# Patient Record
Sex: MALE | Race: WHITE | Employment: STUDENT
[De-identification: names, ages, dates, MRNs, and addresses within clinical notes are randomized per-mention and may not be internally consistent; named-entity substitution may affect disease eponyms.]

---
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
## 2020-01-02 ENCOUNTER — HOSPITAL ENCOUNTER (OUTPATIENT)
Dept: PHYSICAL THERAPY | Facility: CLINIC | Age: 20
Setting detail: THERAPIES SERIES
Discharge: HOME | End: 2020-01-02
Payer: COMMERCIAL

## 2020-01-02 PROCEDURE — 97110 THERAPEUTIC EXERCISES: CPT

## 2020-01-02 PROCEDURE — 97140 MANUAL THERAPY 1/> REGIONS: CPT

## 2020-01-02 NOTE — FLOWSHEET NOTE
[x] [facility] and Therapy    [address], [place]    Phone: [phone]    Fax:  [phone]       Physical Therapy Daily Treatment Note    Date:  2020  Patient Name:  Pedrito Kirkland    :  2000  MRN: [id number]  Physician: [address] [place] [address] [address]: [employer]- [employer]  Medical Diagnosis: M54.5- LBP                   Rehab Codes: M54.5  Onset Date: 2019                      Next 's appt. : 1/10/2020  Visit# / total visits:2/10  Cancels/No Shows: 0/0    Subjective:    Pain:  [x] Yes  [] No Location:  Pain Rating: (0-10 scale) 2-3/10 now; worst 4/10   Pain altered Tx:  [x] No  [] Yes  Action:  Comments:no known changes since IE. Objective:  Modalities: none   Precautions:   Exercises: pt returns to school 2020  Exercise Reps/ Time Weight/ Level Comments   Squats on [employer] machine 2x10 110 lbs Deep squats                                                                     Manual:  1.  DI to L glut med in SL and prone, piriformis in SL  2. Pubic correction     Other:    Specific Instructions for next treatment:    Treatment Charges: Mins Units   []  Phys perf test     [x]  Ther Exercise 15 1   [x]  Manual Therapy 30 2   []  Ther Activities     []  NMR     []  Vasocompression     []  Other     Total Treatment time 45 3       Assessment: [x] Progressing toward goals. FABERs and sheer test were initially +. However, after DI to L glut med and piriformis and pubic correction, he no longer have a + test.  He identified \"ass to the grass\" deep squats as a comparable sign. However, he squatted 2x10 with 110 lbs and had no pain. [] No change. [] Other:    Goals:  STG: (to be met in 6 treatments)  1. ? Pain: Patient will report < 3/10 pain at rest and < 5/10 pain with active movement in order to improve QOL. 2. ? ROM: Will deny symptoms with L lumbar rotation, SB AROM in order to improve functional mobility.

## 2020-01-06 ENCOUNTER — HOSPITAL ENCOUNTER (OUTPATIENT)
Dept: PHYSICAL THERAPY | Facility: CLINIC | Age: 20
Setting detail: THERAPIES SERIES
Discharge: HOME | End: 2020-01-06
Payer: COMMERCIAL

## 2020-01-06 PROCEDURE — 97140 MANUAL THERAPY 1/> REGIONS: CPT

## 2020-01-06 PROCEDURE — 97110 THERAPEUTIC EXERCISES: CPT

## 2020-01-06 NOTE — FLOWSHEET NOTE
[x] [facility] Therapy    [address] [place], [place]    Phone: [phone]    Fax:  [phone]       Physical Therapy Daily Treatment Note    Date:  2020  Patient Name:  Rosalba Paniagua  :  2000  MRN: [id number]  Physician: [address] [facility] [address]: [employer]  Medical Diagnosis: M54.5- LBP     Rehab Codes: M54.5  Onset Date: 2019                      Next 's appt. : 1/10/2020  Visit# / total visits: 3/10  Cancels/No Shows: 0/0    Subjective:    Pain:  [x] Yes  [] No Location:  Pain Rating: (0-10 scale) 2-3/10 now; worst 4/10   Pain altered Tx:  [x] No  [] Yes  Action:  Comments:did squats and dead lifts with 135# 3x10 each before he attended PT today     Objective:  Modalities: none   Precautions:   Exercises: pt returns to school 2020  Exercise Reps/ Time Weight/ Level Comments   Squats on [employer] machine 2x10 110 lbs Deep squats   Quadruped ar/leg  15  W/ dowel sabine on back   Quadruped arm/leg raise 15  On bench   CC post sling ex 20 20 lbs                                                          Manual:  1.  DI to L glut med in SL and prone, piriformis in SL  2. Pubic correction elicited an audible significant cavitation    Other:    Specific Instructions for next treatment:    Treatment Charges: Mins Units   []  Phys perf test     [x]  Ther Exercise 15 1   [x]  Manual Therapy 10 1   []  Ther Activities     []  NMR     []  Vasocompression     []  Other     Total Treatment time 25 2       Assessment: [x] Progressing toward goals. Functionally, he is progressing as he was able to dead lift and squat 3x10 with up to 135 lbs. Sheer and FABERs tests were neg. Core stability is deficient but this was most likely deficient before. Leg length was = after pubic correction       [] No change.      [] Other:    Goals:  STG: (to be met in 6 treatments)  1. ? Pain: Patient will report < 3/10 pain at rest and < 5/10 pain with active movement in order to improve QOL. 2. ? ROM: Will deny symptoms with L lumbar rotation, SB AROM in order to improve functional mobility. 3. ? Strength: Will demo appropriate T.A., ES activation in challenging, sport-specific positions- as well as consistent PPT- in order to prevent future injury. 4. ? Function: Will report < 3/10 P with 30 min sitting, standing in order to improve driving, work tolerance. 5. Independent with Home Exercise Programs  6. Will perform dead-lifts, squats against light resistance with < 3/10 pain and proper body mechanics. 7. Improve modified oswestry to 5%     Patient goals: \"deadlift and squat without pain. \"   Pt. Education:  [x] Yes  [] No  [] Reviewed Prior HEP/Ed  Method of Education: [x] Verbal resume weight lifting as able   [] Demo  [] Written  Comprehension of Education:  [x] Verbalizes understanding. [] Demonstrates understanding. [] Needs review. [] Demonstrates/verbalizes HEP/Ed previously given. Plan: [x] Continue per plan of care. See in 2 days. Cleared to increase weight from 135 to 150-5 lbs.      [] Other:      Time In:1108 Time Out: 1140    Electronically signed by:  Ra Tipton, PT

## 2020-01-08 ENCOUNTER — HOSPITAL ENCOUNTER (OUTPATIENT)
Dept: PHYSICAL THERAPY | Facility: CLINIC | Age: 20
Setting detail: THERAPIES SERIES
Discharge: HOME | End: 2020-01-08
Payer: COMMERCIAL

## 2020-01-08 PROCEDURE — 97140 MANUAL THERAPY 1/> REGIONS: CPT

## 2020-01-08 NOTE — DISCHARGE SUMMARY
[] No change. [] Other:    Goals:  STG: (to be met in 6 treatments)  1. ? Pain: Patient will report < 3/10 pain at rest and < 5/10 pain with active movement in order to improve QOL. 2. ? ROM: Will deny symptoms with L lumbar rotation, SB AROM in order to improve functional mobility. 3. ? Strength: Will demo appropriate T.A., ES activation in challenging, sport-specific positions- as well as consistent PPT- in order to prevent future injury. 4. ? Function: Will report < 3/10 P with 30 min sitting, standing in order to improve driving, work tolerance. 5. Independent with Home Exercise Programs  6. Will perform dead-lifts, squats against light resistance with < 3/10 pain and proper body mechanics. 7. Improve modified oswestry to 5%     Patient goals: \"deadlift and squat without pain. \"   Pt. Education:  [x] Yes  [] No  [] Reviewed Prior HEP/Ed  Method of Education: [x] Verbal resume weight lifting as able   [] Demo  [] Written  Comprehension of Education:  [x] Verbalizes understanding. [] Demonstrates understanding. [] Needs review. [] Demonstrates/verbalizes HEP/Ed previously given. Plan: [] Continue per plan of care. [x] Other: As long as his symptoms continue to improve, he can continue to progress return to lifting. However, if he doesn't improve further, he can follow up with [facility] health center at [facility].         Time In:1108 Time Out: 1140    Electronically signed by:  Maryuri Gongora, PT

## 2020-01-09 ENCOUNTER — HOSPITAL ENCOUNTER (OUTPATIENT)
Dept: PHYSICAL THERAPY | Facility: CLINIC | Age: 20
Setting detail: THERAPIES SERIES
End: 2020-01-09
Payer: COMMERCIAL

## 2020-03-09 ENCOUNTER — HOSPITAL ENCOUNTER (OUTPATIENT)
Dept: PHYSICAL THERAPY | Facility: CLINIC | Age: 20
Setting detail: THERAPIES SERIES
Discharge: HOME | End: 2020-03-09
Payer: COMMERCIAL

## 2020-03-09 PROCEDURE — 97140 MANUAL THERAPY 1/> REGIONS: CPT

## 2020-03-09 NOTE — FLOWSHEET NOTE
med, neg on hip flexor. Tenderness subsided after manual.  revieweed need not to assume anterior pelvic tilt during ex and that he may be continuing to aggravate either by excessive effort or by anterior pelvic tilt. [] No change. [] Other:    Goals:  STG: (to be met in 6 treatments)  1. ? Pain: Patient will report < 3/10 pain at rest and < 5/10 pain with active movement in order to improve QOL. 2. ? ROM: Will deny symptoms with L lumbar rotation, SB AROM in order to improve functional mobility. 3. ? Strength: Will demo appropriate T.A., ES activation in challenging, sport-specific positions- as well as consistent PPT- in order to prevent future injury. 4. ? Function: Will report < 3/10 P with 30 min sitting, standing in order to improve driving, work tolerance. 5. Independent with Home Exercise Programs  6. Will perform dead-lifts, squats against light resistance with < 3/10 pain and proper body mechanics. 7. Improve modified oswestry to 5%     Patient goals: \"deadlift and squat without pain. \"   Pt. Education:  [x] Yes  [] No  [] Reviewed Prior HEP/Ed  Method of Education: [x] Verbal resume weight lifting as able   [] Demo  [] Written  Comprehension of Education:  [x] Verbalizes understanding. [] Demonstrates understanding. [] Needs review. [] Demonstrates/verbalizes HEP/Ed previously given. Plan: [x] Continue per plan of care.    [] Other:       Time VY:1130 Time Out: 5268    Electronically signed by:  Steffanie Sanchez, PT

## 2020-03-11 ENCOUNTER — HOSPITAL ENCOUNTER (OUTPATIENT)
Dept: PHYSICAL THERAPY | Facility: CLINIC | Age: 20
Setting detail: THERAPIES SERIES
Discharge: HOME | End: 2020-03-11
Payer: COMMERCIAL

## 2020-03-11 PROCEDURE — 20561 NDL INSJ W/O NJX 3+ MUSC: CPT

## 2020-03-11 PROCEDURE — 97140 MANUAL THERAPY 1/> REGIONS: CPT

## 2020-03-11 PROCEDURE — 97110 THERAPEUTIC EXERCISES: CPT

## 2020-03-11 NOTE — FLOWSHEET NOTE
[x] [facility]    [address]    Phone: [phone]    Fax:  [phone]       Physical Therapy Daily Treatment    Date:  3/11/2020  Patient Name:  Taj Lopez  :  2000  MRN: [id number]  Physician: Dr. Siddiqi People: MMO- unlimited  Medical Diagnosis:  LBP      Rehab Codes: M54.5  Onset Date: 2019                      Next 's appt.: none  Visit# / total visits: 6/10  Cancels/No Shows: 0/0    Subjective:    Pain:  [x] Yes  [] No Location:  Pain Rating: (0-10 scale) 2-3/10 now; worst 7-8/10   Pain altered Tx:  [x] No  [] Yes  Action:  Comments: Notes low back pain has waxed and waned and he has tried to get back into lifting but has been starting with light weights and progressing around 10# per week, per prior therapist recommendations. Objective:  Modalities: none   Precautions:   Exercises: pt returns to school 2020  Exercise Reps/ Time Weight/ Level Comments   Squats on Ad Dynamo machine 2x10 110 lbs Deep squats   Quadruped ar/leg  15  W/ dowel sabine on back   Quadruped arm/leg raise 15  On bench   CC post sling ex 20 20 lbs          Rocking Piri S 3x30''           Prone hip ext 20x ea     Prone hip ext- glute max 20x ea     Prone swimmers 20x ea     Prone supermans 20x           PPT + SLR 10x3'' ea                 Manual:  1. IASTM and STM  to L glut med and QL  IDN L lumbar paraspinals L3-L5 and SI joint/PSIS - with electrical stimulation no adverse reactions  Sidelying lumbar roll opening technique 8x5'' opening L  SI joint supine opening technique 5x5'' opening L    Other:  EDUCATION:  Educated patient on importance of lumbar roll with prolonged sitting in car and on car rides. Also educated patient on importance of maintaining neutral spine while lifting whether squatting/deadlifting/bench pressing.   Demonstrated proper squat technique with patient demonstrating ability to maintain neutral lumbar spine (while not under load). Specific Instructions for next treatment:     Treatment Charges: Mins Units   []     [x]  Ther Exercise 15 1   [x]  Manual Therapy 25 2   []  Ther Activities     []  NMR     []  Vasocompression     [x]  Other: IDN>3 12 1   Total Treatment time 54 4       Assessment: [x] Progressing toward goals. [] No change. [x] Other: IDN side effects and procedure explained in full with verbalization of understanding and wishes to proceed with treatment. No adverse reactions to treatment. VCing and demonstration required for proper neutral spine with squats and deadlifts. Demonstrated proper technique including setup and pull for deadlifts with good understanding verbalized and demonstrated. Educated on proper programming to decrease frequency of squatting and deadlifting in subsequent days to include one or the other, and to not increase return to lifting by more than 10% weight increases per week. Goals:  STG: (to be met in 6 treatments)  1. ? Pain: Patient will report < 3/10 pain at rest and < 5/10 pain with active movement in order to improve QOL. 2. ? ROM: Will deny symptoms with L lumbar rotation, SB AROM in order to improve functional mobility. 3. ? Strength: Will demo appropriate T.A., ES activation in challenging, sport-specific positions- as well as consistent PPT- in order to prevent future injury. 4. ? Function: Will report < 3/10 P with 30 min sitting, standing in order to improve driving, work tolerance. 5. Independent with Home Exercise Programs  6. Will perform dead-lifts, squats against light resistance with < 3/10 pain and proper body mechanics. 7. Improve modified oswestry to 5%     Patient goals: \"deadlift and squat without pain. \"   Pt.  Education:  [x] Yes  [] No  [] Reviewed Prior HEP/Ed  Method of Education: [x] Verbal resume weight lifting as able   [] Demo  [] Written  Comprehension of Education:  [x] [employer]

## 2020-03-18 ENCOUNTER — HOSPITAL ENCOUNTER (OUTPATIENT)
Dept: PHYSICAL THERAPY | Facility: CLINIC | Age: 20
Setting detail: THERAPIES SERIES
Discharge: HOME | End: 2020-03-18
Payer: COMMERCIAL

## 2020-03-18 PROCEDURE — 97110 THERAPEUTIC EXERCISES: CPT

## 2020-03-18 PROCEDURE — 97140 MANUAL THERAPY 1/> REGIONS: CPT

## 2020-03-18 PROCEDURE — 20561 NDL INSJ W/O NJX 3+ MUSC: CPT

## 2020-03-18 NOTE — FLOWSHEET NOTE
[x] [facility] and Therapy    [address], [place]    Phone: [phone]    Fax:  [phone]       Physical Therapy Daily Treatment    Date:  3/18/2020  Patient Name:  Freya Cohen  :  2000  MRN: [id number]  Physician: Dr. Hanna Major: MMO- unlimited  Medical Diagnosis:  LBP      Rehab Codes: M54.5  Onset Date: 2019                      Next 's appt.: none  Visit# / total visits: 10  Cancels/No Shows: 0/0    Subjective:    Pain:  [x] Yes  [] No Location:  Pain Rating: (0-10 scale) 2-3/10 now; worst 7-8/10   Pain altered Tx:  [x] No  [] Yes  Action:  Comments: Notes no improvements in back pain overall since last visit. States spent 12 hours in the car over this past weekend which created an increase in low back pain. Has been trying to lift however creating pain. States he hasn't been lifting weights much at all since last visit- has been running and doing push-ups. \"Been stretching a ton lately. \"  Been driving with lumbar support that \"helps a little. \"    Objective:  Modalities: none   Precautions:   Exercises:  Exercise Reps/ Time Weight/ Level Comments   Elliptical 6'     HS S 2x30''  bilat         Full plank- pball - elbows on ball 20x ea  Fwd/back, Side/side, cw, ccw   Palloff Press 2' x 3'' grn cord    Trunk rotation 15x grn cord                Rocking Piri S 3x30''           SL Reverse Hyper 2x15 4# Standing off edge of mat          Prone Press-Ups 2x15     Prone hip ext 30x ea     Prone hip ext- glute max 30x ea     Prone swimmers 30x ea     Prone supermans 30x           Quad Cat/Camel 15x3'' ea     Quadruped Fire Hydrant 15x ea     Quadruped Hip Ext 15x ea           PPT + SLR 10x3'' ea           Manual:  1.   IASTM and STM  to L glut med and QL  IDN L lumbar paraspinals L3-L5 and SI joint/PSIS - with electrical stimulation no adverse reactions  Sidelying lumbar roll opening technique 8x5''

## 2020-03-25 ENCOUNTER — APPOINTMENT (OUTPATIENT)
Dept: PHYSICAL THERAPY | Facility: CLINIC | Age: 20
End: 2020-03-25
Payer: COMMERCIAL

## 2020-04-17 ENCOUNTER — HOSPITAL ENCOUNTER (OUTPATIENT)
Dept: MRI IMAGING | Age: 20
Discharge: HOME | End: 2020-04-19
Payer: COMMERCIAL

## 2020-04-17 PROCEDURE — 72148 MRI LUMBAR SPINE W/O DYE: CPT

## 2020-04-17 PROCEDURE — 72195 MRI PELVIS W/O DYE: CPT

## 2020-06-17 ENCOUNTER — HOSPITAL ENCOUNTER (OUTPATIENT)
Dept: PAIN MANAGEMENT | Age: 20
Discharge: HOME | End: 2020-06-17
Payer: COMMERCIAL

## 2020-06-17 PROCEDURE — 99203 OFFICE O/P NEW LOW 30 MIN: CPT

## 2020-06-17 PROCEDURE — 99244 OFF/OP CNSLTJ NEW/EST MOD 40: CPT | Performed by: PAIN MEDICINE

## 2020-06-17 ASSESSMENT — ENCOUNTER SYMPTOMS: BACK PAIN: 1

## 2020-06-17 NOTE — CONSULTS
Final 02/15/2019  9:49 AM - [address] Lab   Platelet Estimate NOT REPORTED   Final 02/15/2019        X-Ray reports:  EXAMINATION:   MRI OF THE LUMBAR SPINE WITHOUT CONTRAST, 4/17/2020 1:12 pm       TECHNIQUE:   Multiplanar multisequence MRI of the lumbar spine was performed without the   administration of intravenous contrast.       COMPARISON:   None.       HISTORY:   ORDERING SYSTEM PROVIDED HISTORY: Chronic low back pain, unspecified back   pain laterality, unspecified whether sciatica present   TECHNOLOGIST PROVIDED HISTORY:   Reason for Exam: chronic low back pain over spine and SI joints, negative   x-rays, no improvement from PT   Additional signs and symptoms: pain for the past 8 months. no previous spine   surgeries       FINDINGS:   BONES/ALIGNMENT: There is normal alignment of the spine. The vertebral body   heights are maintained. The bone marrow signal appears unremarkable.       SPINAL CORD: The conus terminates normally.       SOFT TISSUES: No paraspinal mass identified.       L1-L2: There is no significant disc herniation, spinal canal stenosis or   neural foraminal narrowing.       L2-L3: There is no significant disc herniation, spinal canal stenosis or   neural foraminal narrowing.       L3-L4: There is no significant disc herniation, spinal canal stenosis or   neural foraminal narrowing.       L4-L5: There is no significant disc herniation, spinal canal stenosis or   neural foraminal narrowing.       L5-S1: There is no significant disc herniation, spinal canal stenosis or   neural foraminal narrowing.           Impression   Unremarkable MRI of the lumbar spine. EXAMINATION:   MRI OF THE SACRUM/SI JOINT WITHOUT CONTRAST       4/17/2020 1:13 pm       COMPARISON:   Radiographs March 30, 2016       HISTORY:   ORDERING SYSTEM PROVIDED HISTORY: Chronic low back pain, unspecified back   pain laterality, unspecified whether sciatica present.    TECHNOLOGIST PROVIDED HISTORY:   Reason for

## 2020-06-21 ASSESSMENT — ENCOUNTER SYMPTOMS
VOMITING: 0
BOWEL INCONTINENCE: 0
CONSTIPATION: 0
NAUSEA: 0
PHOTOPHOBIA: 0
EYE REDNESS: 0
COUGH: 0
ABDOMINAL PAIN: 0
APNEA: 0

## 2020-06-22 ENCOUNTER — HOSPITAL ENCOUNTER (OUTPATIENT)
Dept: PREADMISSION TESTING | Age: 20
Setting detail: SPECIMEN
Discharge: HOME | End: 2020-06-26
Payer: COMMERCIAL

## 2020-06-22 PROCEDURE — U0003 INFECTIOUS AGENT DETECTION BY NUCLEIC ACID (DNA OR RNA); SEVERE ACUTE RESPIRATORY SYNDROME CORONAVIRUS 2 (SARS-COV-2) (CORONAVIRUS DISEASE [COVID-19]), AMPLIFIED PROBE TECHNIQUE, MAKING USE OF HIGH THROUGHPUT TECHNOLOGIES AS DESCRIBED BY CMS-2020-01-R: HCPCS

## 2020-06-25 LAB — SARS-COV-2, NAA: NOT DETECTED

## 2020-06-26 ENCOUNTER — HOSPITAL ENCOUNTER (OUTPATIENT)
Dept: GENERAL RADIOLOGY | Age: 20
Discharge: HOME | End: 2020-06-28
Payer: COMMERCIAL

## 2020-06-26 ENCOUNTER — HOSPITAL ENCOUNTER (OUTPATIENT)
Dept: PAIN MANAGEMENT | Age: 20
Discharge: HOME | End: 2020-06-26
Payer: COMMERCIAL

## 2020-06-26 VITALS
OXYGEN SATURATION: 100 % | HEIGHT: 70 IN | HEART RATE: 66 BPM | SYSTOLIC BLOOD PRESSURE: 125 MMHG | RESPIRATION RATE: 16 BRPM | TEMPERATURE: 97.4 F | DIASTOLIC BLOOD PRESSURE: 66 MMHG | WEIGHT: 160 LBS | BODY MASS INDEX: 22.9 KG/M2

## 2020-06-26 PROCEDURE — 76000 FLUOROSCOPY <1 HR PHYS/QHP: CPT

## 2020-06-26 PROCEDURE — 6360000002 HC RX W HCPCS: Performed by: PAIN MEDICINE

## 2020-06-26 PROCEDURE — G0260 INJ FOR SACROILIAC JT ANESTH: HCPCS

## 2020-06-26 PROCEDURE — 27096 INJECT SACROILIAC JOINT: CPT | Performed by: PAIN MEDICINE

## 2020-06-26 PROCEDURE — 6360000004 HC RX CONTRAST MEDICATION: Performed by: PAIN MEDICINE

## 2020-06-26 RX ORDER — TRIAMCINOLONE ACETONIDE 40 MG/ML
INJECTION, SUSPENSION INTRA-ARTICULAR; INTRAMUSCULAR
Status: COMPLETED | OUTPATIENT
Start: 2020-06-26 | End: 2020-06-26

## 2020-06-26 RX ORDER — ROPIVACAINE HYDROCHLORIDE 5 MG/ML
INJECTION, SOLUTION EPIDURAL; INFILTRATION; PERINEURAL
Status: COMPLETED | OUTPATIENT
Start: 2020-06-26 | End: 2020-06-26

## 2020-06-26 RX ADMIN — ROPIVACAINE HYDROCHLORIDE 6 ML: 5 INJECTION, SOLUTION EPIDURAL; INFILTRATION; PERINEURAL at 09:34

## 2020-06-26 RX ADMIN — IOHEXOL 1 ML: 180 INJECTION INTRAVENOUS at 09:34

## 2020-06-26 RX ADMIN — TRIAMCINOLONE ACETONIDE 40 MG: 40 INJECTION, SUSPENSION INTRA-ARTICULAR; INTRAMUSCULAR at 09:35

## 2020-06-26 ASSESSMENT — PAIN DESCRIPTION - PROGRESSION: CLINICAL_PROGRESSION: NOT CHANGED

## 2020-06-26 ASSESSMENT — PAIN - FUNCTIONAL ASSESSMENT
PAIN_FUNCTIONAL_ASSESSMENT: 0-10
PAIN_FUNCTIONAL_ASSESSMENT: 0-10
PAIN_FUNCTIONAL_ASSESSMENT: PREVENTS OR INTERFERES SOME ACTIVE ACTIVITIES AND ADLS

## 2020-06-26 ASSESSMENT — PAIN DESCRIPTION - DESCRIPTORS: DESCRIPTORS: BURNING;ACHING

## 2020-06-26 ASSESSMENT — PAIN DESCRIPTION - LOCATION: LOCATION: BACK

## 2020-06-26 ASSESSMENT — PAIN DESCRIPTION - ONSET: ONSET: ON-GOING

## 2020-06-26 ASSESSMENT — PAIN DESCRIPTION - PAIN TYPE: TYPE: CHRONIC PAIN

## 2020-06-26 ASSESSMENT — PAIN DESCRIPTION - ORIENTATION: ORIENTATION: LEFT;LOWER

## 2020-06-26 ASSESSMENT — PAIN SCALES - GENERAL: PAINLEVEL_OUTOF10: 0

## 2020-06-26 ASSESSMENT — PAIN DESCRIPTION - FREQUENCY: FREQUENCY: INTERMITTENT

## 2020-06-26 NOTE — PROGRESS NOTES
Discharge criteria met. Post procedure dressing dry and intact. Sensory and motor function intact as per pre-procedure. Patient alert and oriented x3  Instructions and follow up reviewed with pt at patient at discharge. Discharged home transported by Izabela LUQUE Per  wheelchair, family waits in car per Covid-19 protocol.   Discharged @8482

## 2020-06-29 ENCOUNTER — TELEPHONE (OUTPATIENT)
Dept: PAIN MANAGEMENT | Age: 20
End: 2020-06-29

## 2020-07-09 ENCOUNTER — HOSPITAL ENCOUNTER (OUTPATIENT)
Dept: PAIN MANAGEMENT | Age: 20
Discharge: HOME | End: 2020-07-09
Payer: COMMERCIAL

## 2020-07-09 PROCEDURE — 99213 OFFICE O/P EST LOW 20 MIN: CPT

## 2020-07-09 PROCEDURE — 99441 PR PHYS/QHP TELEPHONE EVALUATION 5-10 MIN: CPT | Performed by: PAIN MEDICINE

## 2020-07-09 ASSESSMENT — ENCOUNTER SYMPTOMS
BACK PAIN: 1
BOWEL INCONTINENCE: 0
ABDOMINAL PAIN: 0
EYE PAIN: 0
SINUS PRESSURE: 0

## 2020-07-09 NOTE — PROGRESS NOTES
Rik Corrales is a 23 y.o. male evaluated on 7/9/2020. Modality of virtual service provided -via  telephone   Consent:  Patient and/or health care decision maker is aware that that patient may receive a bill for this telephone service, depending on one's insurance coverage, and has provided verbal consent to proceed: Yes    Patient identification was verified at the start of the visit: Yes    Chief complaint: Rik Corrales is 23 y.o.,  male, with  No chief complaint on file. .    Patient is complaining of pain involving the low back area. He had undergone left SI joint injection on 6/26/2020. Patient reports good improvement in the pain by about 90%. He has some pain especially with activity. Patient reports the pain is not as severe and is able to function well and at this time he satisfied with the pain relief. Back Pain   This is a chronic problem. The current episode started more than 1 month ago. The problem occurs intermittently. The problem has been gradually improving since onset. The pain is present in the gluteal, sacro-iliac and lumbar spine. The quality of the pain is described as aching. The pain does not radiate. The pain is at a severity of 3/10 (2-7). The pain is mild. The pain is worse during the day. The symptoms are aggravated by standing, sitting and twisting (showeling, ). Pertinent negatives include no abdominal pain, bowel incontinence, chest pain, dysuria, numbness, tingling or weakness. Risk factors include lack of exercise and obesity. Alleviating factors:heat and rest   Lifestyle changes experienced with pain: Prevents or limits ADLs  Mood changes,none  Patient currently employed. Physical therapy did not help the pain. Are you under psychological counseling at present: No  Goals for treatment include:  Decrease in pain  Enjoy daily and recreational activities, return to previous status.       ACTIVITY/SOCIAL/EMOTIONAL:  Sleep Pattern: 7 hours per night.  generally restful sleep  Home Exercises: daily stretching and strengthening  Activity:increased  Emotional Issues: normal.   Currently seeing a Psychiatrist or Psychologist:  No    Past Medical History:   Diagnosis Date    Asthma     Eczema     GERD (gastroesophageal reflux disease)        Past Surgical History:   Procedure Laterality Date    ADENOIDECTOMY      [place] OFFICE/OUTPT VISIT,PROCEDURE ONLY N/A 10/1/2018    PEDIATRIC BRONCHOSCOPY [facility] N/A 03/12/2019    with biopsy    UPPER GASTROINTESTINAL ENDOSCOPY N/A 3/12/2019    EGD BIOPSY performed by Maris Guardado MD at [place] History   Problem Relation Age of Onset    Asthma Paternal Uncle     Rheum Arthritis Other     Thyroid Disease Other     Pancreatic Cancer Other     Other Other         Constipation        Social History     Socioeconomic History    Marital status: Single     Spouse name: None    Number of children: None    Years of education: None    Highest education level: None   Occupational History    Occupation: Student   Social Needs    Financial resource strain: None    Food insecurity     Worry: None     Inability: None    Transportation needs     Medical: None     Non-medical: None   Tobacco Use    Smoking status: Never Smoker    Smokeless tobacco: Never Used   Substance and Sexual Activity    Alcohol use: No    Drug use: No    Sexual activity: None   Lifestyle    Physical activity     Days per week: None     Minutes per session: None    Stress: None   Relationships    Social connections     Talks on phone: None     Gets together: None     Attends Taoism service: None     Active member of club or organization: None     Attends meetings of clubs or organizations: None     Relationship status: None    Intimate partner violence     Fear of current or ex partner: None     Emotionally abused: None     Physically abused: Endocrine: Negative. Genitourinary: Negative. Negative for dysuria and hematuria. Musculoskeletal: Positive for back pain. Skin: Negative for rash. Allergic/Immunologic: Negative. Neurological: Negative. Negative for tingling, weakness and numbness. Psychiatric/Behavioral: Negative for self-injury, sleep disturbance and suicidal ideas. The patient is not nervous/anxious. Physical Exam  Skin:         Neurological:      Mental Status: He is alert and oriented to person, place, and time. Psychiatric:         Mood and Affect: Mood normal.        DATA:  LAB.:  2/18/2019  2:34 PM - [place] [place] Incoming Lab Results From [facility]     Component Value Ref Range & Units Status Collected Lab   IgA 84  70 - 400 mg/dL Final 02/15/2019  9:49  [address]   Gliadin Deaminidated Peptide AB IGA 0.5  <7.0 U/mL Final 02/15/2019  9:49 AM [id number] [employer] [employer]          X-Ray reports:  EXAMINATION:   MRI OF THE LUMBAR SPINE WITHOUT CONTRAST, 4/17/2020 1:12 pm       TECHNIQUE:   Multiplanar multisequence MRI of the lumbar spine was performed without the   administration of intravenous contrast.       COMPARISON:   None.       HISTORY:   ORDERING SYSTEM PROVIDED HISTORY: Chronic low back pain, unspecified back   pain laterality, unspecified whether sciatica present   TECHNOLOGIST PROVIDED HISTORY:   Reason for Exam: chronic low back pain over spine and SI joints, negative   x-rays, no improvement from PT   Additional signs and symptoms: pain for the past 8 months. no previous spine   surgeries       FINDINGS:   BONES/ALIGNMENT: There is normal alignment of the spine. The vertebral body   heights are maintained.  The bone marrow signal appears unremarkable.       SPINAL CORD: The conus terminates normally.       SOFT TISSUES: No paraspinal mass identified.       L1-L2: There is no significant disc herniation, spinal canal stenosis or   neural foraminal narrowing.       L2-L3: There is no significant disc herniation, spinal canal stenosis or   neural foraminal narrowing.       L3-L4: There is no significant disc herniation, spinal canal stenosis or   neural foraminal narrowing.       L4-L5: There is no significant disc herniation, spinal canal stenosis or   neural foraminal narrowing.       L5-S1: There is no significant disc herniation, spinal canal stenosis or   neural foraminal narrowing.           Impression   Unremarkable MRI of the lumbar spine. EXAMINATION:   MRI OF THE SACRUM/SI JOINT WITHOUT CONTRAST       4/17/2020 1:13 pm       COMPARISON:   Radiographs March 30, 2016       HISTORY:   ORDERING SYSTEM PROVIDED HISTORY: Chronic low back pain, unspecified back   pain laterality, unspecified whether sciatica present. TECHNOLOGIST PROVIDED HISTORY:   Reason for Exam: Chronic low back pain over spine and SI joints, negative   x-rays, no improvement from PT   Additional signs and symptoms: pain for the past 8 months. no previous spine   surgeries       FINDINGS:   No acute fracture.  No suspicious bone marrow replacing lesion.       Sacroiliac joints are intact.  No erosive changes.  No widening.  No   increased fluid.       No surrounding soft tissue abnormality.       On T2 weighted images the right S2 nerve root appears thickened with   decreased surrounding fluid.           Impression   Abnormal appearance of the right S2 nerve root could be on the basis of a   neuritis or neuroma.  Recommend follow-up with postcontrast imaging. Clinical  impression:  1. Sacroiliitis ([address]    2. Myofascial pain        Plan of care:  Patient is doing well with good pain relief after the  last procedure. Patient is able to increase the activity levels. Patient does not complain much pain and is overall satisfied with the pain relief. As patient is doing well at this time no further interventions are needed.   Counselling/Preventive measures for pain  Control:    [x]  Spine strengthening exercises are activity modification, heat/ice as needed, Urine drug screen as required. [x]The patient's questions were answered to the best of my abilities. This note was created using voice recognition software. There may be inaccuracies of transcription  that are inadvertently overlooked prior to the signature. There is any questions about the transcription please contact me. Return As needed  with physician / CNP  for further plan of treatment. Due to the COVID-19 pandemic and the appropriate interventions by Anjel Bhatt, our non-urgent pain management patients will not be seen in the office at this time for their protection and the protection of our staff. To offer continuity of care, their prescriptions will be escribed this month after a careful chart review and review of their OARRS report  Pursuant to the emergency declaration under the [employer] and [facility], 1135 waiver authority and the [employer] Act, this Virtual Visit was conducted, with patient's consent, to reduce the patient's risk of exposure to COVID-19 and provide continuity of care for an established patient. Services were provided through a video synchronous discussion virtually to substitute for in-person appointment. \"  Documentation:  I communicated with the patient and/or health care decision maker about plan of care  Details of this discussion including any medical advice provided: Total Time: minutes: 11-20 minutes    I affirm this is a Patient Initiated Episode with an Established Patient who has not had a related appointment within my department in the past 7 days or scheduled within the next 24 hours.     Electronically signed by Kian Sandra MD on 7/10/2020 at 6:16 AM

## 2020-07-10 ASSESSMENT — ENCOUNTER SYMPTOMS
CONSTIPATION: 0
RESPIRATORY NEGATIVE: 1
SHORTNESS OF BREATH: 0
NAUSEA: 0
ALLERGIC/IMMUNOLOGIC NEGATIVE: 1
VOMITING: 0
PHOTOPHOBIA: 0
COUGH: 0

## 2020-07-20 ENCOUNTER — HOSPITAL ENCOUNTER (OUTPATIENT)
Dept: PHYSICAL THERAPY | Facility: CLINIC | Age: 20
Setting detail: THERAPIES SERIES
Discharge: HOME | End: 2020-07-20
Payer: COMMERCIAL

## 2020-07-20 PROCEDURE — 97161 PT EVAL LOW COMPLEX 20 MIN: CPT

## 2020-07-20 PROCEDURE — 97530 THERAPEUTIC ACTIVITIES: CPT

## 2020-07-20 NOTE — CONSULTS
[] [facility]) - [facility] &  Therapy  [address]  [phone]  F: [phone] [] [address]  [place] [address]   [address]  P: [phone]  F: [phone] [x] [address]  Therapy  [address]  P: [phone]  F: [phone] [] [address]  [facility]   [address]   [place]: [phone]  F: [phone]      Physical Therapy Spine Evaluation    Date:  2020  Patient: Blake Mariano   : 2000  MRN: [id number]  Physician: Dr. Manuela Serrano: Mary Felix (unlimited; no copay, ded met, 10%)  Medical Diagnosis: Sacroilitis, myofascial pain Rehab Codes: M46.1   Onset Date: 9/15/19     Next 's appt.: none    Subjective:   CC: L sided LBP that interferes with daily activities, exercises, and summer job of landscaping  HPI: Since last PT, he had an MR and a cortizone injection into SI joint. No change for the first few days, then he had a couple of days where he had no pain and forgot about his back. He has since returned to lifting (no deadlifts, squat 135 or less), but also has been landscaping full time. As he goes about the workday, the pain is much better than it was pre op. Bending at work to [employer]. Has 9 more work days. Cycling hurts >45 min. Trek 1000 (54 cm). PMHx: [x] Unremarkable [] Diabetes [] HTN  [] Pacemaker   [] MI/Heart Problems [] Cancer [] Arthritis [] Other:              [] Refer to full medical chart  In EPIC     Tests:    [x] MRI: sacrum  Impression    Abnormal appearance of the right S2 nerve root could be on the basis of a    neuritis or neuroma.  Recommend follow-up with postcontrast imaging.         MRI: Lumbar spine  mpression    Unremarkable MRI of the lumbar spine.             Medications: [x] Refer to full medical record [] None [] Other:  Allergies:      [x] Refer to full medical record [] None [] Other:    Pain:  [x] Yes  [] No Location: L LB Pain Rating: (0-10 scale) 4/10  Pain altered Tx:  [] Yes  [x] No  Action:    Symptoms:  [] Improving [] Worsening [x] Same  Better:  [] AM    [] PM    [] Sit    [] Rise/Sit    []Stand    [] Walk    [] Lying    [] Other:  Worse: [] AM    [] PM    [] Sit    [] Rise/Sit    []Stand    [] Walk    [] Lying    [] Bend                      [] Valsalva    [] Other:  Sleep: [x] OK    [] Disturbed    Objective:      STRENGTH  STRENGTH  ROM    Left Right  Left Right     C5 Shld Abd   L1-2 Hip Flex       Shld Flexion   Hip Abd       Shld IR   L3-4 Knee Ext        Shld ER   L4 Ankle DF        C6 Elb Flex   L5 EHL       C7 Elb Ext   S1 Plant. Flex   Lumbar    C8 EPL   Abdominals   Flex/ext excessive   T1 Fing Abd   Erector Spinae   SB and rot excessive         Hip  wnl wnl                                                                                  TESTS (+/-) LEFT RIGHT Not Tested   SLR [] sit [] supine neg neg []   Sheer neg pos []      []      []   Slump/Dural neg neg []   SI JT   []   JASMEET neg neg []       OBSERVATION No Deficit Deficit Not Tested Comments   Posture       Forward Head [] [] []    Rounded Shoulders [] [] []    Kyphosis [] [] []    Lordosis [] [] []    Lateral Shift [x] [] []    Scoliosis [] [] []    Iliac Crest [x] [] []    PSIS [x] [] []    ASIS [x] [] []    Genu Valgus [] [] []    Genu Varus [] [] []    Genu Recurvatum [] [] []    Pronation [] [] []    Supination [] [] []    Leg Length Discrp [x] [] []    Slumped Sitting [] [] []    Palpation [] [x] []    Sensation [] [x] [] Diminished on L PSIS   Edema [] [] []    Neurological [] [] []        Functional Test: Modifiied Oswestry Score: 18% functionally impaired     Comments:    Assessment:  Patient would benefit from skilled physical therapy services in order to: decrease pain so that he can return to a painfree state.  After his injection, he returned to weight lifting up to 135 lb squats in multiple sets of 10 and worked as a , which involves prolonged forward bending, twisting, improper lifting mechanics, and heavy awkward loads. He has also continued to stretch frequently throughout the day and currently demonstrates an excessive amount of ROM in the L spine. He does not present with a significant pelvic obliquity in standing or supine. He was advised not to stretch as his ROM is excessive, reviewed proper lifting mechanics, avoid weight lifting. Problems:    [x] ? Pain:  [x] excessive ROM:  [x] ? Strength:  [x] ? Function:  [] Other:      STG: (to be met in 5 treatments)  1. ? Pain:<2/10 at the worst  2. ? Strength:   3. ? Function: <8% interference on Modified Oswestry  4. Patient to be independent with home exercise program as demonstrated by performance with correct form without cues. LTG: (to be met in 10 treatments)  1. Independent with proper lifting mechanics  2. No pain in L spine  3. Independent with exercise regime that doesn't aggravate his low back. Patient goals: \"no more back pain\"    Rehab Potential:  [] Good  [x] Fair  [] Poor   Suggested Professional Referral:  [x] No  [] Yes:  Barriers to Goal Achievement:  [x] No  [] Yes:  Domestic Concerns:  [x] No  [] Yes:    Pt. Education:  [x] Plans/Goals, Risks/Benefits discussed  [] Home exercise program  Method of Education: [x] Verbal  [] Demo  [] Written  Comprehension of Education:  [x] Verbalizes understanding. [] Demonstrates understanding. [] Needs Review. [] Demonstrates/verbalizes understanding of HEP/Ed previously given.     Treatment Plan:  [x] Therapeutic Exercise   17521  [] Iontophoresis: 4 mg/mL Dexamethasone Sodium Phosphate  mAmin  38654   [] Therapeutic Activity  97019 [] Vasopneumatic cold with compression  82466    [] Gait Training   63633 [] Ultrasound   23483   [x] Neuromuscular Re-education  17944 [] Electrical Stimulation Unattended  37525   [x] Manual Therapy  38721 [] Electrical Stimulation Attended  [id number]   [x] Instruction in HEP  [] Lumbar/Cervical Traction  [id number]   [] Aquatic Therapy   [id number] [] Cold/hotpack    [] Massage   97730      [] Dry Needling, 1 or 2 muscles  32761   [] Biofeedback, first 15 minutes   52879  [] Biofeedback, additional 15 minutes   58406 [] Dry Needling, 3 or more muscles  04374       Frequency:  1-2 x/week for 10 visits    Todays Treatment:  Modalities: none  Precautions: standard; avoid L spine stretching  Exercises:  Exercise Reps/ Time Weight/ Level Comments                                 Other:  Therapeutic act: reviewed proper lifting mechanics (decreasing horizontal distance, no twisting, no forward bending)   Specific Instructions for next treatment: he is to follow up with Katja Antunez PT in [facility]      Evaluation Complexity:  History (Personal factors, comorbidities) [x] 0 [] 1-2 [] 3+   Exam (limitations, restrictions) [x] 1-2 [] 3 [] 4+   Clinical presentation (progression) [x] Stable [] Evolving  [] Unstable   Decision Making [x] Low [] Moderate [] High    [x] Low Complexity [] Moderate Complexity [] High Complexity       Treatment Charges: Mins Units   [x] Evaluation       [x]  Low       []  Moderate       []  High  1   []  Modalities     []  Ther Exercise     []  Manual Therapy     [x]  Ther Activities 15 1   []  Aquatics     []  Vasocompression     []  Other       TOTAL TREATMENT TIME: 45    Time in: 5579      Time out: 550 Aime Mikey    Electronically signed by: Amena Patel PT        Physician Signature:________________________________Date:__________________  By signing above or cosigning this note, I have reviewed this plan of care and certify a need for medically necessary rehabilitation services.      *PLEASE SIGN ABOVE AND FAX BACK ALL PAGES*

## 2020-07-28 ENCOUNTER — HOSPITAL ENCOUNTER (OUTPATIENT)
Dept: PHYSICAL THERAPY | Facility: CLINIC | Age: 20
Setting detail: THERAPIES SERIES
Discharge: HOME | End: 2020-07-28
Payer: COMMERCIAL

## 2020-07-28 PROCEDURE — 97140 MANUAL THERAPY 1/> REGIONS: CPT

## 2020-07-28 PROCEDURE — 97110 THERAPEUTIC EXERCISES: CPT

## 2020-07-28 NOTE — FLOWSHEET NOTE
[x] [facility]  Outpatient Rehabilitation &  Therapy  [facility]   [place]: [phone]  F: [phone]      Physical Therapy Daily Treatment Note    Date:  2020  Patient Name:  Shahana Chou    :  2000  MRN: [id number]  Physician: Dr. Eleni Zacarias: Gordon Bass (unlimited; no copay, ded met, 10%)  Medical Diagnosis: Sacroilitis, myofascial pain       Rehab Codes: M46.1   Onset Date: 9/15/19                                                  Next 's appt.: none  Visit# / total visits: 3/20    Cancels/No Shows:     Subjective:    Pain:  [x] Yes  [] No Location: lower back Left SI  Pain Rating: (0-10 scale) 2/10  Pain altered Tx:  [x] No  [] Yes  Action:  Comments: Pt with low back pain that has been persistent since 2020, saw Estefanía Wallace and Anant at [email or web address] and had little relief. 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 Stretches for temporary relief, he has held off on the stretches. Pain clinic for Lumbar cortisone shot, felt good after 5 days.      He works 7 hrs day, 5 days a week landscaping  Weightlifting push/pull/legs/bike 5x week  Holding off on dead lifts, avoidance of trunk hyperextension     Pain is worse with persistent flexion at the spine (landscaping) for 30-40 mins,     Objective:    Somatic Dysfunctions Normal Deficit Details   Cervical   [] []    Thoracic   [] []    Rib   [] []    Pelvis   [] [x] Pubic dysfunction-MET  L post innon-MET   Lumbar [] []    SI   [] [x] L on R SI-MET, MOB  L piriformis DI  Bilat gastroc MFR-hypervolt      Exercises:  Exercise Reps/ Time Weight/ Level Comments             SL Hip Abd  x20     Clamshells 2 way  x20     Quad Hip Abd      Quad Hip Ext      Prone hip ext      Flying squirrels      Bridges         SL Bridges  x20                 Gastroc belt S  30\"x3       Other:      Treatment Charges: Mins Units   []  Modalities     [x]  Ther Exercise 15 1   [x]  Manual Therapy 25 2 []  Ther Activities     []  Aquatics     []  Vasocompression     []  Other     Total Treatment time *40** 3       Assessment: [x] Progressing toward goals. [] No change. [] Other:  [] Patient would continue to benefit from skilled physical therapy services in order to:     STG/LTG    Pt. Education:  [x] Yes  [] No  [x] Reviewed Prior HEP/Ed  Method of Education: [x] Verbal  [x] Demo  [] Written  Comprehension of Education:  [x] Verbalizes understanding. [x] Demonstrates understanding. [] Needs review. [] Demonstrates/verbalizes HEP/Ed previously given. Plan: [x] Continue current frequency toward long and short term goals.     [] Specific Instructions for subsequent treatments:       Time In:1700            Time Out: 0997    Electronically signed by:  Larry Manuel PT

## 2020-08-10 ENCOUNTER — HOSPITAL ENCOUNTER (OUTPATIENT)
Dept: PHYSICAL THERAPY | Facility: CLINIC | Age: 20
Setting detail: THERAPIES SERIES
Discharge: HOME | End: 2020-08-10
Payer: COMMERCIAL

## 2020-08-10 PROCEDURE — 97140 MANUAL THERAPY 1/> REGIONS: CPT

## 2020-08-10 NOTE — FLOWSHEET NOTE
continue to benefit from skilled physical therapy services in order to:  Advance strength and mobility with ADLs and eventual return to sport/exercise program     STG/LTG    Pt. Education:  [x] Yes  [] No  [x] Reviewed Prior HEP/Ed  Method of Education: [x] Verbal  [x] Demo  [] Written  Comprehension of Education:  [x] Verbalizes understanding. [x] Demonstrates understanding. [] Needs review. [] Demonstrates/verbalizes HEP/Ed previously given. Plan: [x] Continue current frequency toward long and short term goals.     [] Specific Instructions for subsequent treatments:       Time In:1400          Time Out: 1944    Electronically signed by:  Greg Wise, PT

## 2020-09-01 ENCOUNTER — HOSPITAL ENCOUNTER (OUTPATIENT)
Dept: PHYSICAL THERAPY | Facility: CLINIC | Age: 20
Setting detail: THERAPIES SERIES
Discharge: HOME | End: 2020-09-01
Payer: COMMERCIAL

## 2020-09-01 NOTE — CARE COORDINATION
[] Pending sale to [facility] &  Therapy  [address]  [phone]  F: [phone] [] [address] [facility] [address]   [address]  P: [phone]  F: [phone] [x] [address] &  Therapy  [address] [facility]  P: [phone]  F: [phone] [] [address]  [facility]   [address]   [place]: [phone]  F: [phone]     THERAPY RESPONSIBILITY OF CARE TRANSFER FORM       PATIENT NAME: Juan C Osorio  MRN: [id number]   : 2000      TRANSFERRING FACILITY:    [x] [facility]   [] [facility]   []  [facility]   [] [facility]   [] [facility]   [] [facility]   [] [facility]    [] Other:       ACCEPTING FACILITY   [] [facility]   [] [facility]   []  [facility]   [] [facility]   [] [facility]   [x] [facility]   [] [facility]    [] Other:          REASON FOR TRANSFER: transfer due manual specialist      TRANSFER OF CARE:    I am transferring the care of the above patient to: SUDHIR Carrion PT  2020      ACCEPTANCE OF CARE:     I am accepting the care of the above patient.  Rosa Elena Botello, PT

## 2020-11-06 ENCOUNTER — OFFICE VISIT (OUTPATIENT)
Dept: ORTHOPEDIC SURGERY | Age: 20
End: 2020-11-06
Payer: COMMERCIAL

## 2020-11-06 VITALS
BODY MASS INDEX: 22.9 KG/M2 | HEIGHT: 70 IN | TEMPERATURE: 97.4 F | SYSTOLIC BLOOD PRESSURE: 125 MMHG | DIASTOLIC BLOOD PRESSURE: 56 MMHG | HEART RATE: 59 BPM | WEIGHT: 160 LBS

## 2020-11-06 PROCEDURE — 99214 OFFICE O/P EST MOD 30 MIN: CPT | Performed by: FAMILY MEDICINE

## 2020-11-06 NOTE — PROGRESS NOTES
Sports Medicine Consultation     CHIEF COMPLAINT:  Back Pain (Lt low back. had an injury 9/2019 SI joint went out fell with barbell lunge. glute med pain going up the back)      HPI:  Abdoulaye Wong is a 23y.o. year old male who is a  established patient being seen for regarding new problem left lumbar back pain. The pain has been present for 1+ year(s). The patient recalls a felt his back go out on a barbell lunge injury. The patient has tried PT, inj, manip, meds short intermittent improvement. The pain is described as burning, achy muscles in spasm all the around. There is not numbness or tingling radiating down the both legs  It is  stiff upon arising from sitting. There was brief change in bowel or bladder function    he has a past medical history of Asthma, Eczema, and GERD (gastroesophageal reflux disease). he has a past surgical history that includes Adenoidectomy; pr office/outpt visit,procedure only (N/A, 10/1/2018); Upper gastrointestinal endoscopy (N/A, 03/12/2019); and Upper gastrointestinal endoscopy (N/A, 3/12/2019). family history includes Asthma in his paternal uncle; Other in an other family member; Pancreatic Cancer in an other family member; Rheum Arthritis in an other family member; Thyroid Disease in an other family member.     Social History     Socioeconomic History    Marital status: Single     Spouse name: Not on file    Number of children: Not on file    Years of education: Not on file    Highest education level: Not on file   Occupational History    Occupation: Student   Social Needs    Financial resource strain: Not on file    Food insecurity     Worry: Not on file     Inability: Not on file   [employer] needs     Medical: Not on file     Non-medical: Not on file   Tobacco Use    Smoking status: Never Smoker    Smokeless tobacco: Never Used   Substance and Sexual Activity    Alcohol use: No    Drug use: No    Sexual activity: Not on file Lifestyle    Physical activity     Days per week: Not on file     Minutes per session: Not on file    Stress: Not on file   Relationships    Social connections     Talks on phone: Not on file     Gets together: Not on file     Attends Christian service: Not on file     Active member of club or organization: Not on file     Attends meetings of clubs or organizations: Not on file     Relationship status: Not on file    Intimate partner violence     Fear of current or ex partner: Not on file     Emotionally abused: Not on file     Physically abused: Not on file     Forced sexual activity: Not on file   Other Topics Concern    Not on file   Social History Narrative    Not on file       Current Outpatient Medications   Medication Sig Dispense Refill    montelukast (SINGULAIR) 10 MG tablet TAKE 1 TABLET BY MOUTH ONCE DAILY. 90 tablet 0    minocycline (MINOCIN;DYNACIN) 100 MG capsule Take 100 mg by mouth 2 times daily      tretinoin (RETIN-A) 0.1 % cream tretinoin 0.1 % topical cream      busPIRone (BUSPAR) 7.5 MG tablet Take 7.5 mg by mouth daily      ATROVENT HFA 17 MCG/ACT inhaler INHALE 1 PUFF INTO THE LUNGS 3 TIMES A DAY. 25.8 g 0    fluticasone (FLONASE) 50 MCG/ACT nasal spray 2 sprays by Each Nare route daily      montelukast (SINGULAIR) 10 MG tablet Take 1 tablet by mouth daily Diagnosis asthma 90 tablet 1    cetirizine (ZYRTEC) 10 MG tablet Take 10 mg by mouth daily      albuterol sulfate HFA (PROAIR HFA) 108 (90 BASE) MCG/ACT inhaler Inhale 2 puffs into the lungs every 6 hours as needed for Wheezing 2 Inhaler 0    Respiratory Therapy Supplies (VORTEX HOLDING CHAMBER/MASK) BRANDYN 1 Device by Does not apply route daily 1 Device 0    beclomethasone (QVAR) 80 MCG/ACT inhaler Inhale 2 puffs into the lungs 2 times daily 1 Inhaler 3     No current facility-administered medications for this visit. Allergies:  heis allergic to cat hair extract and dog epithelium.     ROS:  CV:  Denies chest pain; palpitations; shortness of breath; swelling of feet, ankles; and loss of consciousness. CON: Denies fever and dizziness. ENT:  Denies hearing loss / ringing, ear infections hoarseness, and swallowing problems. RESP:  Denies chronic cough, spitting up blood, and asthma/wheezing. GI: Denies abdominal pain, change in bowel habits, nausea or vomiting, and blood in stools. :  Denies frequent urination, burning or painful urination, blood in the urine, and bladder incontinence. NEURO:  Denies headache, memory loss, sleep disturbance, and tremor or movement disorder. PHYSICAL EXAM:   BP (!) 125/56 (Site: Right Upper Arm)   Pulse 59   Temp 97.4 °F (36.3 °C)   Ht 5' 10\" (1.778 m)   Wt 160 lb (72.6 kg)   BMI 22.96 kg/m²   GENERAL: Shazia Stout is a 23 y.o. male who is alert and oriented and sitting comfortably in our office. SKIN:  Intact without rashes, lesions or ulcerations. NEURO: Sensation to the extremity is intact. VASC:  Capillary refill is less than 3 seconds. Distal pulses are palpable. There is no lymphadenopathy. Inspection- No deformity, no atrophy  Palpation - Tenderness: lumbar paraspinal  ROM - normal  Strength- WNL  Sensation -WNL  Reflexes - WNL  SLR: negative  Antonia: negative    Gait: normal    PSYCH:  Good fund of knowledge and displays understanding of exam.    RADIOLOGY: No results found. MRI reviewed was normal  IMPRESSION:     1. Chronic low back pain without sciatica, unspecified back pain laterality    2. Fatigue due to excessive exertion, initial encounter    3. Family history of rheumatism          PLAN:   We discussed some of the etiologies and natural histories of     ICD-10-CM    1. Chronic low back pain without sciatica, unspecified back pain laterality  M54.5 [facility]    G89.29    2. Fatigue due to excessive exertion, initial encounter  T73. 3XXA C-Reactive Protein     Comprehensive Metabolic Panel     Sedimentation Rate     CBC With Auto Differential     Rheumatoid Factor     ANNA Screen With Reflex     Vitamin D 25 Hydroxy   3. Family history of rheumatism  Z82.69 C-Reactive Protein     Comprehensive Metabolic Panel     Sedimentation Rate     CBC With Auto Differential     Rheumatoid Factor     ANNA Screen With Reflex     Vitamin D 25 Hydroxy   . We discussed the various treatment alternatives including anti-inflammatory medications, physical therapy, injections, further imaging studies and as a last resort surgery. At this point this is a complicated issue as there is some issues with family history of rheumatological conditions and I certainly feel that evaluating for any underlying rheumatoid conditions such as ankylosing spondylitis with lab work is extremely reasonable this labs were ordered the same time I think this is a functional issue that is based on poor posturing heavy lifting I do think that formal physical therapy will be the right answer we will go ahead and set him up for that we will have him return to all activity with some limitations particularly maximal weight lifting and see him back based on his progression and have him start physical therapy on his fall break patient mother voiced understanding agreement this plan visit encompassed over 45 minutes with over 25 minutes discussing diagnosis and treatment plan    Return to clinic in No follow-ups on file. Rojelio Strange     Please be aware portions of this note were completed using voice recognition software and unforeseen errors may have occurred    Electronically signed by Benton Mcleod DO, FAOASM on 11/6/20 at 11:35 AM EST

## 2020-11-24 ENCOUNTER — HOSPITAL ENCOUNTER (OUTPATIENT)
Dept: PHYSICAL THERAPY | Facility: CLINIC | Age: 20
Setting detail: THERAPIES SERIES
Discharge: HOME | End: 2020-11-24
Payer: COMMERCIAL

## 2020-11-24 PROCEDURE — 97110 THERAPEUTIC EXERCISES: CPT

## 2020-11-24 PROCEDURE — 97161 PT EVAL LOW COMPLEX 20 MIN: CPT

## 2020-11-24 PROCEDURE — 97140 MANUAL THERAPY 1/> REGIONS: CPT

## 2020-11-24 NOTE — CONSULTS
[x] THE [facility] &  Therapy  [facility]   [place]: [phone]  F: [phone]      Physical Therapy Spine Evaluation    Date:  2020  Patient: Bryce Acevedo  : 2000  MRN: [id number]  Physician: Dr Mallorie Matthews DO   Insurance: MMO (unlimited, no copay)  Medical Diagnosis: Chronic LBP  Rehab Codes: M54.5  Onset Date:   Next 's appt. : \      Subjective:   CC/HPI:  Pt with low back pain that started while doing [employer], pain is in SI jt. Started . He has seen PT, meds, injections with little to no improvement. Returned to see Dr Jorge Schilder recently  Due to symptoms not relieving in left side midback and left SI. 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 he has a past surgical history that includes Adenoidectomy; pr office/outpt visit,procedure only (N/A, 10/1/2018); Upper gastrointestinal endoscopy (N/A, 2019); and Upper gastrointestinal endoscopy (N/A, 3/12/2019).     Per Dr Mallorie Matthews:  I think this is a functional issue that is based on poor posturing heavy lifting I do think that formal physical therapy will be the right answer we will go ahead and set him up for that we will have him return to all activity with some limitations particularly maximal weight lifting and see him back based on his progression and have him start physical therapy        [] Unremarkable               [x] Refer to full medical chart  In EPIC     Tests: [] X-Ray:   [] MRI:   Impression    Abnormal appearance of the right S2 nerve root could be on the basis of a    neuritis or neuroma.  Recommend follow-up with postcontrast imaging.                   MRI: Lumbar spine  mpression    Unremarkable MRI of the lumbar spine.                  [] Other:    Medications: [] Refer to full medical record [] None [] Other:  Allergies:      [] Refer to full medical record [] None [] Other:      Function:  Hand Dominance  [] Right  [] Left  Job status    Work Activities/duties  [employer] push/pull/legs/bike 5-6 days week  Running 5-6 days a week  Holding off on squats and deadlift          Pain present? yes   Location L midback/SI   Pain Rating currently L midback 3/10  L SI 4/10   Pain at worse L midback/SI 8/10   Pain at best 3/10   Description of pain Dull ache in midback  Dull ache SI   Altered Sensation intact   What makes it worse Movement, moving spine   What makes it better Rest in supine neutral    Symptom progression Same    Sleep Ok             Objective:   STRENGTH    Left Right   L1-2 Hip Flex     Hip Abd     L3-4 Knee Ext     L4 Ankle DF     L5 EHL     S1 Plant. Flex     Abdominals     Erector Spinae     PPT from 90 to= 60 60                                          Cervical ROM Left Right   Flexion      Extension      Rotation      Sidebend      Retraction            Lumbar ROM Left Right   Flexion      Extension      Rotation  noble 25% noble 25%   Sidebend      UE/LE                                  TESTS (+/-) LEFT RIGHT Not Tested   SLR supine   []   Hamstring (SLR)   []   SKTC   []   DKTC   []   Slump/Dural   []   SI JT   []   JASMEET   []   Joint Mobility   []   Cerv. Comp   []   Cerv. Distraction   []   Cerv.  Alar/Transverse   []   Vertebral Artery   []   Adsons   []   Marbella Bookman   []     OBSERVATION No Deficit Deficit Not Tested Comments   Posture       Forward Head [] [x] []    Rounded Shoulders [] [x] []    Kyphosis [] [x] []    Lordosis [] [] []    Lateral Shift [] [] []    Scoliosis [] [] []    Iliac Crest [] [] []    PSIS [] [] []    ASIS [] [] []    Genu Valgus [] [] []    Genu Varus [] [] []    Genu Recurvatum [] [] []    Pronation [] [] []    Supination [] [] []    Leg Length Discrp [] [] []    Slumped sitting [] [x] []    Palpation [] [] []    Sensation [] [] []    Edema [] [] []    Neurological [] [] []        Somatic Dysfunctions Normal Deficit Details   Cervical   [] []    Thoracic   [] [x] FRSL T4-T8   Rib   [] [x] L 4-8th rib post    Pelvis   [x] []    Lumbar [] [x] FRSR L5   SI   [] [x] L on R SI       Flexibility Normal Left tight Right tight   Hamstring [] [] []   Hip flexor [] [x] [x]   Quad [] [] []   Piriformis [] [x] [x]   ITB [] [] []   Gastroc/Soleus [] [x] [x]         FUNCTION Normal Difficult Unable   Sitting [] [] []   Standing [] [] []   Ambulation [] [] []   Groom/Dress [] [] []   Lift/Carry [] [] []   Stairs [] [] []   Bending [] [x] []   OH reach [] [] []   Sit to Stand [] [] []     Comments: Pt with tightness in the calf, hip flexor R>L even though pain is focused more on the left side. Assessment:     STG: (to be met in 10 treatments)  1. ? Pain: Pt to decrease pain levels to 4/10  2. ? ROM: Increase flexibility throughout to ease ADL progression  3. ? Strength: Increase core/LE strength to 5/5 MMT  4. Independent with Home Exercise Programs  5. LTG: (to be met in 20 treatments)  1. Improve score of functional assessment tool Oswestry   2. Decrease pain to 2/10 or less with ADLs    Patient goals: Decrease pain    Rehab Potential:  [x] Good  [] Fair  [] Poor   Suggested Professional Referral:  [x] No  [] Yes:  Barriers to Goal Achievement[de-identified]  [x] No  [] Yes:  Domestic Concerns:  [x] No  [] Yes:    Pt. Education:  [x] Plans/Goals, Risks/Benefits discussed  [x] Home exercise program  Method of Education: [x] Verbal  [x] Demo  [x] Written  Comprehension of Education:  [x] Verbalizes understanding. [x] Demonstrates understanding. [x] Needs Review. [x] Demonstrates/verbalizes understanding of HEP/Ed previously given.     Treatment Plan:  [x] Therapeutic Exercise   [] Electrical Stimulation  [x] Manual Therapy     [] Lumbar/Cervical Traction  [x] Neuromuscular Re-education [] Cold/hotpack [] Iontophoresis: 4 mg/mL  [x]

## 2020-12-03 ENCOUNTER — APPOINTMENT (OUTPATIENT)
Dept: PHYSICAL THERAPY | Facility: CLINIC | Age: 20
End: 2020-12-03
Payer: COMMERCIAL

## 2020-12-04 ENCOUNTER — HOSPITAL ENCOUNTER (OUTPATIENT)
Dept: PHYSICAL THERAPY | Facility: CLINIC | Age: 20
Setting detail: THERAPIES SERIES
Discharge: HOME | End: 2020-12-04
Payer: COMMERCIAL

## 2020-12-04 PROCEDURE — 97110 THERAPEUTIC EXERCISES: CPT

## 2020-12-04 PROCEDURE — 97140 MANUAL THERAPY 1/> REGIONS: CPT

## 2020-12-04 NOTE — FLOWSHEET NOTE
[] THE [facility] &  Therapy  [facility]   [place]: [phone]  F: [phone]      Physical Therapy Daily Treatment Note    Date:  2020  Patient: Jayleen Shah                       : 2000                    MRN: [id number]  Physician: Dr Cassie Hurley DO                               Insurance: MMO (unlimited, no copay)  Medical Diagnosis: Chronic LBP                  Rehab Codes: M54.5  Onset Date:                 Next 's appt. :    Visit# / total visits:   Cancels/No Shows: 0/0    Subjective:    Pain:  [x] Yes  [] No Location: LBP   Pain Rating: (0-10 scale) 3/10  Pain altered Tx:  [] No  [] Yes  Action:  Comments: Pt with mild pain in the low back 3/10 on left side. He was in the car for 6 hours yesterday so symptoms are a little tight this am.     Objective: Todays Treatment:     Exercises:  Exercise     LBP Reps/ Time Weight/ Level Comments                       *Hip flexor S  30\"x3   Add ER   *Standing piriformis S 30\"x3       Calf Inv Belt S 30\"x3                  *SL Hip Abd         *Clamshells         *Prone hip ext                   *Planks  2'       *Sd planks  2'                                                         Other:  Somatic Dysfunctions Normal Deficit Details   Cervical    []?  []?      Thoracic    []?  [x]?  FRSL T4-T8-MOB   Rib    []?  [x]?  L 4-8th rib post-MOB   Pelvis    [x]?  []?   L upslip-MET  Pubic dysfunction-MOB     Lumbar []?  [x]?  FRSR L5-MOB     SI    []?  [x]?  L on R SI-MOB  L piriformis DI  L hip flexor DI         Specific Instructions for next treatment: Advance core stability, stretches      Treatment Charges: Mins Units   []  Modalities     [x]  Ther Exercise 10 1   [x]  Manual Therapy 30 2   []  Ther Activities     []  Aquatics     []  Vasocompression     []  Other     Total Treatment time 40 3       Assessment: [] Progressing toward goals. [] No change.      [x] Other: Educated patient on proper form for working out on his own, reviewed HEP and discussed self-treatment for muscle tightness. Patient inquired on numerous questions about lifting techniques, training styles and proper form/progression on his own. STG/LTG:                STG: (to be met in 10 treatments)  1. ? Pain: Pt to decrease pain levels to 4/10  2. ? ROM: Increase flexibility throughout to ease ADL progression  3. ? Strength: Increase core/LE strength to 5/5 MMT  4. Independent with Home Exercise Programs  5.       LTG: (to be met in 20 treatments)  1. Improve score of functional assessment tool Oswestry   2. Decrease pain to 2/10 or less with ADLs    Pt. Education:  [x] Yes  [] No  [x] Reviewed Prior HEP/Ed  Method of Education: [x] Verbal  [x] Demo  [] Written  Comprehension of Education:  [] Verbalizes understanding. [x] Demonstrates understanding. [x] Needs review. [] Demonstrates/verbalizes HEP/Ed previously given. Plan: [] Continue current frequency toward long and short term goals.     [x] Specific Instructions for subsequent treatments:       Time In:0700            Time Out: 0800    Electronically signed by:  Juanita Mason, PT

## 2021-09-10 ENCOUNTER — HOSPITAL ENCOUNTER (OUTPATIENT)
Dept: PHYSICAL THERAPY | Age: 21
Setting detail: THERAPIES SERIES
Discharge: HOME | End: 2021-09-10
Payer: COMMERCIAL

## 2021-09-10 NOTE — FLOWSHEET NOTE
[facility], [address], [address] [place] [address]        Physical Therapy  Cancellation/No-show Note  Patient Name:  Govind Degroot  :  2000   Date:  9/10/2021  Cancelled visits to date: 0  No-shows to date:      For today's appointment patient:  []  Cancelled  []  Rescheduled appointment  [x]  No-show     Reason given by patient:  []  Patient ill  []  Conflicting appointment  []  No transportation    []  Conflict with work  []  No reason given  []  Other:     Comments: Pt no showed initial evaluation, attempted to confirm appointment yesterday but no answer     Electronically signed by:  Hina Le, PT

## 2021-09-17 ENCOUNTER — HOSPITAL ENCOUNTER (OUTPATIENT)
Dept: PHYSICAL THERAPY | Age: 21
Setting detail: THERAPIES SERIES
Discharge: HOME | End: 2021-09-17
Payer: COMMERCIAL

## 2021-09-17 PROCEDURE — 97161 PT EVAL LOW COMPLEX 20 MIN: CPT

## 2021-09-17 PROCEDURE — 97110 THERAPEUTIC EXERCISES: CPT

## 2021-09-17 PROCEDURE — 97140 MANUAL THERAPY 1/> REGIONS: CPT

## 2021-09-17 NOTE — FLOWSHEET NOTE
[facility],  [address]  Phone: [phone]  Fax [phone]    Physical Therapy Daily Treatment Note  Date:  2021    Patient Name:  Govind Degroot    :  2000  MRN: [id number]  Restrictions/Precautions:    Physician Information:  Referring Practitioner: Edwyna Gosselin CNP  Medical/Treatment Diagnosis Information:  Diagnosis: M47.818 (ICD-10-CM) - SI joint arthritis  Treatment Diagnosis: SIJ pain left M25.552; generalized core/hip weakness M62.81(2); LBP M54.5  [x] Conservative / [] Surgical - DOS:  Therapy Diagnosis/Practice Pattern:  Practice Pattern F: Spinal Disorders  Insurance/Certification information:  PT Insurance Information:  MEDICAL [place]  - [id number]/10-$0CP-PT/OT MED [address] [facility] signed: [] YES  [x] NO  Number of Comorbidities:  []0     [x]1-2    []3+  Date of Patient follow up with Physician:     Is this a Progress Report:     []  Yes  [x]  No        If Yes:  Date Range for reporting period:  Beginning 2021  Ending     Progress report will be due (10 Rx or 30 days whichever is less):        Recertification will be due (POC Duration  / 90 days whichever is less): 12/10/2021      Latex Allergy:  [x]NO      []YES  Preferred Language for Healthcare:   [x]English       []other:    Visit # Insurance Allowable   1 BMN  auth [x]no        []yes:       SUBJECTIVE:  See eval    OBJECTIVE: See eval   Observation:   Palpation:     Test used Initial score Current Score   Pain Summary VAS     Functional questionnaire Oswestry     ROM Lumbar Flexion      Lumbar Ext      Lumbar SB L/R      Lumbar rotation L/R     Strength Hip ext      ABD      TrA       RESTRICTIONS/PRECAUTIONS: none    Exercises/Interventions:     Therapeutic Ex/NMR re-education  sets/reps Notes HEP   Advanced LTR 15 x   X   [place] mob 15 x  X   Fig 4 single leg bridge  Side plank clam 10 x  10 x  X   X walk  Anti rotation press 2 laps  15 x  X                                                         Pt education on PT progression with higher level function and goals, possible modalities and plan/prognosis 12 min     Manual Intervention       SIJ mobs gr 3-4, STM piriformis, deep pressure to piriformis with hip IR in prone 12 min                               Access Code: [email or web address]  URL: [email or web address]  Date: 09/17/2021  Prepared by: Nargis Lopez    Exercises  Supine Lower Trunk Rotation - 1 x daily - 7 x weekly - 15 reps - 3-5 hold  [place] Pose - 1 x daily - 7 x weekly - 15 reps - 3-5 hold  Figure 4 Bridge - 1 x daily - 7 x weekly - 2-3 sets - 12 reps  Side Plank with Clam and Resistance - 1 x daily - 7 x weekly - 2-3 sets - 12 reps  X Band Walk - 1 x daily - 7 x weekly - 2-3 sets - 10 steps  Squatting Anti-Rotation Press - 1 x daily - 7 x weekly - 2-3 sets - 12 reps      Therapeutic Exercise and NMR EXR  [x] (56688) Provided verbal/tactile cueing for activities related to strengthening, flexibility, endurance, ROM  for improvements in proximal hip and core control with self care, mobility, lifting and ambulation.  [] (66338) Provided verbal/tactile cueing for activities related to improving balance, coordination, kinesthetic sense, posture, motor skill, proprioception  to assist with core control in self care, mobility, lifting, and ambulation.      Therapeutic Activities:    [] (02759 or 73328) Provided verbal/tactile cueing for activities related to improving balance, coordination, kinesthetic sense, posture, motor skill, proprioception and motor activation to allow for proper function  with self care and ADLs  [] (15151) Provided training and instruction to the patient for proper core and proximal hip recruitment and positioning with ambulation re-education     Home Exercise Program:    [x] (59790) Reviewed/Progressed HEP activities related to strengthening, flexibility, endurance, ROM of core, proximal hip and LE for functional self-care, mobility, lifting and ambulation   [] (77168) Reviewed/Progressed HEP activities related to improving balance, coordination, kinesthetic sense, posture, motor skill, proprioception of core, proximal hip and LE for self care, mobility, lifting, and ambulation      Manual Treatments:  PROM / STM / Oscillations-Mobs:  G-I, II, III, IV (PA's, Inf., Post.)  [x] (33529) Provided manual therapy to mobilize proximal hip and LS spine soft tissue/joints for the purpose of modulating pain, promoting relaxation,  increasing ROM, reducing/eliminating soft tissue swelling/inflammation/restriction, improving soft tissue extensibility and allowing for proper ROM for normal function with self care, mobility, lifting and ambulation. Modalities:      [] GR/ESU 15 min    [] GR 15 min  [] ESU     [] CP    [] MHP    [x] declined  Charges:  Timed Code Treatment Minutes: 40   Total Treatment Minutes: 55     [x] EVAL (LOW) 33330 (typically 20 minutes face-to-face)  [] EVAL (MOD) 65689 (typically 30 minutes face-to-face)  [] EVAL (HIGH) 91202 (typically 45 minutes face-to-face)  [] RE-EVAL     [x] XH(61483) x  2  [] IONTO  [] NMR (06253) x     [] VASO  [x] Manual (25052) x 1     [] Other:  [] TA x      [] Mech Traction (28962)  [] ES(attended) (03817)      [] ES (un) (10827):     Goals: Patient stated goal: Pain free return to powerlifting and running  [] Progressing: [] Met: [] Not Met: [] Adjusted     Therapist goals for Patient:   Short Term Goals: To be achieved in: 2 weeks  1. Independent in HEP and progression per patient tolerance, in order to prevent re-injury. [] Progressing: [] Met: [] Not Met: [] Adjusted   2. Patient will have a decrease in pain to facilitate improvement in movement, function, and ADLs as indicated by Functional Deficits. [] Progressing: [] Met: [] Not Met: [] Adjusted     Long Term Goals: To be achieved in: 12 weeks  1.  Disability index score of 8% or less for the oswestry  to assist with reaching prior level of function. [] Progressing: [] Met: [] Not Met: [] Adjusted   2. Patient will demonstrate increased AROM to WNL, good LS mobility, good hip ROM to allow for proper joint functioning as indicated by patients Functional Deficits. [] Progressing: [] Met: [] Not Met: [] Adjusted   3. Patient will demonstrate an increase in Strength to good proximal hip and core activation to allow for proper functional mobility as indicated by patients Functional Deficits. [] Progressing: [] Met: [] Not Met: [] Adjusted   4. Patient will return to running 5+miles at a time without increased symptoms or restriction. [] Progressing: [] Met: [] Not Met: [] Adjusted   5. Patient will demonstrate appropriate mechanics with quatting/deadlift with no compensation to reduce risk of re-injury. [] Progressing: [] Met: [] Not Met: [] Adjusted      Overall Progression Towards Functional goals/ Treatment Progress Update:  [] Patient is progressing as expected towards functional goals listed. [] Progression is slowed due to complexities/Impairments listed. [] Progression has been slowed due to co-morbidities.   [x] Plan just implemented, too soon to assess goals progression <30days   [] Goals require adjustment due to lack of progress  [] Patient is not progressing as expected and requires additional follow up with physician  [] Other    Prognosis for POC: [x] Good [] Fair  [] Poor      Patient requires continued skilled intervention: [x] Yes  [] No      ASSESSMENT:  See eval    Treatment/Activity Tolerance:  [x] Patient tolerated treatment well [] Patient limited by fatigue  [] Patient limited by pain  [] Patient limited by other medical complications  [] Other:       PLAN: See eval  [] Continue per plan of care [] Alter current plan (see comments above)  [x] Plan of care initiated [] Hold pending MD visit [] Discharge      Electronically signed by:  Hina Le PT , DPT [id number]    Note: If patient does not return for scheduled/ recommended follow up visits, this note will serve as a discharge from care along with most recent update on progress.

## 2021-09-17 NOTE — PLAN OF CARE
[facility], [address], [address] [place] [address] [place]  Phone: [phone]  Fax [phone]    Physical Therapy Certification    Dear Referring Practitioner: Jin Nova CNP,    We had the pleasure of evaluating the following patient for physical therapy services at [address] [place] [address]. A summary of our findings can be found in the initial assessment below. This includes our plan of care. If you have any questions or concerns regarding these findings, please do not hesitate to contact me at the office phone number checked above. Thank you for the referral.       Physician Signature:_______________________________Date:__________________  By signing above (or electronic signature), therapists plan is approved by physician    Patient: Abhijeet Shirley   : 2000   MRN: [id number]  Referring Physician: Referring Practitioner: Jin Nova CNP      Evaluation Date: 2021      Medical Diagnosis Information:  Diagnosis: M47.818 (ICD-10-CM) - SI joint arthritis   Treatment Diagnosis: SIJ pain left M25.552; generalized core/hip weakness M62.81(2); LBP M54.5                              Insurance information: PT Insurance Information:  [employer]  - 1000D-90/10-$0CP-PT/OT MED NEC - NO AUTH     Precautions/ Contra-indications: none  Latex Allergy:  [x]NO      []YES  Preferred Language for Healthcare:   [x]English       []Other:    C-SSRS Triggered by Intake questionnaire (Past 2 wk assessment):   [x] No, Questionnaire did not trigger screening.   [] Yes, Patient intake triggered further evaluation      [] C-SSRS Screening completed  [] PCP notified via Plan of Care  [] Emergency services notified     SUBJECTIVE: Patient stated complaint: Had MRI earlier in year and didn't really show much. 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  Has modified lifestyle to deal with it. Can run some but when he pushes mileage up to close to 30 miles he feels it shooting on his left side. 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     Living Status/Prior Level of Function: Independent with ADLs and IADLs, powerlifting, running    OBJECTIVE:     Standing exam ROM/Normal Abnormal Comments   ROM All lumbar motion is WNL some discomfort with OP     flexion   Left PSIS rides high stays high   extension      side bend      Kemps/quadrant      Toe walk (S2) XX     Heel walk (L4) XX     Stork      SLS/SLS with rotation            Strength      Hip abd 4/5 R/L     Hip Ext 4/5 R/L         Reflexes/Sensation: NT   []Dermatomes/Myotomes intact    []UE Reflexes     []Normal []Hypo      []Hyper   []LE Reflexes     []Normal []Hypo      []Hyper   []Babinski/Clonus/Hoffmans:    []Other:    Joint mobility:    []Normal    [x]Hypo - left hip IR   []Hyper    Palpation: increased tissue tension throughout piriformis, particularly near lateral insertion at femur    Functional Mobility/Transfers: independent, lordosis with deadlift    Posture: anterior pelvic tilt    Bandages/Dressings/Incisions: na    Gait: (include devices/WB status) WNL    Orthopedic Special Tests: none                       [x] Patient history, allergies, meds reviewed. Medical chart reviewed. See intake form.      Review Of Systems (ROS):  [x]Performed Review of systems (Integumentary, Limitations (from functional questionnaire and intake)   [x]Reduced ability to tolerate prolonged functional positions   [x]Reduced ability or difficulty with changes of positions or transfers between positions   [x]Reduced ability to maintain good posture and demonstrate good body mechanics with sitting, bending, and lifting   [x]Reduced ability to sleep   [x] Reduced ability or tolerance with driving and/or computer work   [x]Reduced ability to perform lifting, reaching, carrying tasks   [x]Reduced ability to squat   [x]Reduced ability to forward bend   [x]Reduced ability to ambulate prolonged functional periods/distances/surfaces   [x]Reduced ability to ascend/descend stairs   []other:       Participation Restrictions   []Reduced participation in self care activities   [x]Reduced participation in home management activities   [x]Reduced participation in work activities   [x]Reduced participation in social activities. [x]Reduced participation in sport/recreation activities. Classification:   [x]Signs/symptoms consistent with Lumbar instability/stabilization subgroup. [x]Signs/symptoms consistent with Lumbar mobilization/manipulation subgroup, myotomes and dermatomes intact. Meets manipulation criteria. []Signs/symptoms consistent with Lumbar direction specific/centralization subgroup   []Signs/symptoms consistent with Lumbar traction subgroup     []Signs/symptoms consistent with lumbar facet dysfunction   []Signs/symptoms consistent with lumbar stenosis type dysfunction   []Signs/symptoms consistent with nerve root involvement including myotome & dermatome dysfunction   []Signs/symptoms consistent with post-surgical status including: decreased ROM, strength and function.    [x]signs/symptoms consistent with pathology which may benefit from Dry needling     []other:      Prognosis/Rehab Potential:      []Excellent   [x]Good    []Fair   []Poor    Tolerance of evaluation/treatment: []Excellent   [x]Good    []Fair   []Poor  Physical Therapy Evaluation Complexity Justification  [x] A history of present problem with:  [] no personal factors and/or comorbidities that impact the plan of care;  [x]1-2 personal factors and/or comorbidities that impact the plan of care  []3 personal factors and/or comorbidities that impact the plan of care  [x] An examination of body systems using standardized tests and measures addressing any of the following: body structures and functions (impairments), activity limitations, and/or participation restrictions;:  [] a total of 1-2 or more elements   [] a total of 3 or more elements   [x] a total of 4 or more elements   [x] A clinical presentation with:  [x] stable and/or uncomplicated characteristics   [] evolving clinical presentation with changing characteristics  [] unstable and unpredictable characteristics;   [x] Clinical decision making of [x] low, [] moderate, [] high complexity using standardized patient assessment instrument and/or measurable assessment of functional outcome. [x] EVAL (LOW) 96498 (typically 20 minutes face-to-face)  [] EVAL (MOD) 63647 (typically 30 minutes face-to-face)  [] EVAL (HIGH) 79972 (typically 45 minutes face-to-face)  [] RE-EVAL     PLAN: Begin PT focusing on: proximal hip mobilizations, LB mobs, LB core activation, proximal hip activation, and HEP    Frequency/Duration:  1-2 days per week for 12 Weeks:  Interventions:  [x]  Therapeutic exercise including: strength training, ROM, for LE, Glutes and core   [x]  NMR activation and proprioception for glutes , LE and Core   [x]  Manual therapy as indicated for Hip complex, LE and spine to include: Dry Needling/IASTM, STM, PROM, Gr I-IV mobilizations, manipulation.    [x]  Modalities as needed that may include: thermal agents, E-stim, Biofeedback, US, iontophoresis as indicated  [x]  Patient education on joint protection, postural re-education, activity modification, progression of HEP.      GOALS:    Patient stated goal: Pain free return to powerlifting and running  [] Progressing: [] Met: [] Not Met: [] Adjusted     Therapist goals for Patient:   Short Term Goals: To be achieved in: 2 weeks  1. Independent in HEP and progression per patient tolerance, in order to prevent re-injury. [] Progressing: [] Met: [] Not Met: [] Adjusted   2. Patient will have a decrease in pain to facilitate improvement in movement, function, and ADLs as indicated by Functional Deficits. [] Progressing: [] Met: [] Not Met: [] Adjusted     Long Term Goals: To be achieved in: 12 weeks  1. Disability index score of 8% or less for the oswestry  to assist with reaching prior level of function. [] Progressing: [] Met: [] Not Met: [] Adjusted   2. Patient will demonstrate increased AROM to WNL, good LS mobility, good hip ROM to allow for proper joint functioning as indicated by patients Functional Deficits. [] Progressing: [] Met: [] Not Met: [] Adjusted   3. Patient will demonstrate an increase in Strength to good proximal hip and core activation to allow for proper functional mobility as indicated by patients Functional Deficits. [] Progressing: [] Met: [] Not Met: [] Adjusted   4. Patient will return to running 5+miles at a time without increased symptoms or restriction. [] Progressing: [] Met: [] Not Met: [] Adjusted   5. Patient will demonstrate appropriate mechanics with quatting/deadlift with no compensation to reduce risk of re-injury.    [] Progressing: [] Met: [] Not Met: [] Adjusted         Electronically signed by:  Pablo Gar PT DPT [id number]

## 2021-09-20 ENCOUNTER — HOSPITAL ENCOUNTER (OUTPATIENT)
Dept: PHYSICAL THERAPY | Age: 21
Setting detail: THERAPIES SERIES
Discharge: HOME | End: 2021-09-20
Payer: COMMERCIAL

## 2021-09-20 NOTE — FLOWSHEET NOTE
[facility], [address]        Physical Therapy  Cancellation/No-show Note  Patient Name:  Kaycee Yoon  :  2000   Date:  2021  Cancelled visits to date: 0  No-shows to date: 2     For today's appointment patient:  []  Cancelled  []  Rescheduled appointment  [x]  No-show     Reason given by patient:  []  Patient ill  []  Conflicting appointment  []  No transportation    []  Conflict with work  [x]  No reason given  []  Other:     Comments:     Electronically signed by:  Gin Vincent, PT

## 2021-09-28 ENCOUNTER — HOSPITAL ENCOUNTER (OUTPATIENT)
Dept: PHYSICAL THERAPY | Age: 21
Setting detail: THERAPIES SERIES
Discharge: HOME | End: 2021-09-28
Payer: COMMERCIAL

## 2021-09-28 NOTE — FLOWSHEET NOTE
[facility], [address]        Physical Therapy  Cancellation/No-show Note  Patient Name:  Lynette Gamez  :  2000   Date:  2021  Cancelled visits to date: 1  No-shows to date: 2     For today's appointment patient:  [x]  Cancelled  []  Rescheduled appointment  []  No-show     Reason given by patient:  []  Patient ill  []  Conflicting appointment  []  No transportation    []  Conflict with work  []  No reason given  [x]  Other:  Pt expressed to  that he wanted to see someone specialized in sports medicine and wanted to wait for Luiz Abraham- his primary therapist.    Comments:     Electronically signed by:  Ana Luisa Galvez PT, Julianne Cordero 87, OMT-C    Physical Therapist [id number] [address] license #[id number]  Physical Therapist [place] license #[id number]

## 2021-09-29 ENCOUNTER — HOSPITAL ENCOUNTER (OUTPATIENT)
Dept: PHYSICAL THERAPY | Age: 21
Setting detail: THERAPIES SERIES
Discharge: HOME | End: 2021-09-29
Payer: COMMERCIAL

## 2021-09-29 NOTE — FLOWSHEET NOTE
[facility], [address]        Physical Therapy  Cancellation/No-show Note  Patient Name:  Rashad Catalan  :  2000   Date:  2021  Cancelled visits to date: 1  No-shows to date: 3     For today's appointment patient:  []  Cancelled  []  Rescheduled appointment  [x]  No-show     Reason given by patient:  []  Patient ill  []  Conflicting appointment  []  No transportation    []  Conflict with work  [x]  No reason given  []  Other:     Comments:     Electronically signed by:  Kyra Juan, PT

## 2021-10-01 ENCOUNTER — HOSPITAL ENCOUNTER (OUTPATIENT)
Dept: PHYSICAL THERAPY | Age: 21
Setting detail: THERAPIES SERIES
Discharge: HOME | End: 2021-10-01
Payer: COMMERCIAL

## 2021-10-01 PROCEDURE — 97112 NEUROMUSCULAR REEDUCATION: CPT | Performed by: PHYSICAL THERAPIST

## 2021-10-01 PROCEDURE — 97110 THERAPEUTIC EXERCISES: CPT | Performed by: PHYSICAL THERAPIST

## 2021-10-01 PROCEDURE — 97140 MANUAL THERAPY 1/> REGIONS: CPT | Performed by: PHYSICAL THERAPIST

## 2021-10-01 NOTE — FLOWSHEET NOTE
[facility], [address] [place]  Phone: [phone]  Fax [phone]    Physical Therapy Daily Treatment Note  Date:  10/1/2021    Patient Name:  Chencho Austin    :  2000  MRN: [id number]  Restrictions/Precautions:    Physician Information:  Referring Practitioner: Lalito Donaldson CNP  Medical/Treatment Diagnosis Information:  Diagnosis: M47.818 (ICD-10-CM) - SI joint arthritis  Treatment Diagnosis: SIJ pain left M25.552; generalized core/hip weakness M62.81(2); LBP M54.5  [x] Conservative / [] Surgical - DOS:  Therapy Diagnosis/Practice Pattern:  Practice Pattern F: Spinal Disorders  Insurance/Certification information:  PT Insurance Information:  [employer]  - 1000D-90/10-$0CP-PT/OT MED NEC -  [address] signed: [] YES  [x] NO  Number of Comorbidities:  []0     [x]1-2    []3+  Date of Patient follow up with Physician:     Is this a Progress Report:     []  Yes  [x]  No        If Yes:  Date Range for reporting period:  Beginning 2021  Ending     Progress report will be due (10 Rx or 30 days whichever is less):        Recertification will be due (POC Duration  / 90 days whichever is less): 12/10/2021      Latex Allergy:  [x]NO      []YES  Preferred Language for Healthcare:   [x]English       []other:    Visit # Insurance Allowable   2 BMN  auth [x]no        []yes:       SUBJECTIVE:  Responding well to exercises. Doing short jogs but no longer mileage. Patient doing light lifting. 1-2/10 tightness on left side.      OBJECTIVE:    Observation:   Palpation:     Test used Initial score Current Score   Pain Summary VAS     Functional questionnaire Oswestry     ROM Lumbar Flexion      Lumbar Ext      Lumbar SB L/R      Lumbar rotation L/R     Strength Hip ext      ABD      TrA       RESTRICTIONS/PRECAUTIONS: none    Exercises/Interventions:     Therapeutic Ex/NMR re-education sets/reps Notes HEP   Advanced LTR  X   Rhame mob  X    single leg bridge on 6 in step  Side plank clam 15 x bilat    RVL 2 x 10   X   X walk  Anti rotation press   X   Quadruped alt LE/ UE 15 x       Prone plank with hip ext 2 x 10     RDL 10# KB 20 x      FSU to march with runners arm 6 in step 15 x      FER TKE 60# 2 x 15 One hand support    [facility]  Hip Ext 105# 2 x 15                        Pt education on PT progression with higher level function and goals, possible modalities and plan/prognosis 6 min     Manual Intervention       SIJ mobs gr 3-4, STM piriformis, deep pressure to piriformis with hip IR in prone 10 min     Lateral mulligan hip mobs X 4 min                          Access Code: [email or web address]  URL: [email or web address]  Date: 09/17/2021  Prepared by: Elba Lio    Exercises  Supine Lower Trunk Rotation - 1 x daily - 7 x weekly - 15 reps - 3-5 hold  Rhame Pose - 1 x daily - 7 x weekly - 15 reps - 3-5 hold  Figure 4 Bridge - 1 x daily - 7 x weekly - 2-3 sets - 12 reps  Side Plank with Clam and Resistance - 1 x daily - 7 x weekly - 2-3 sets - 12 reps  X Band Walk - 1 x daily - 7 x weekly - 2-3 sets - 10 steps  Squatting Anti-Rotation Press - 1 x daily - 7 x weekly - 2-3 sets - 12 reps      Therapeutic Exercise and NMR EXR  [x] (08958) Provided verbal/tactile cueing for activities related to strengthening, flexibility, endurance, ROM  for improvements in proximal hip and core control with self care, mobility, lifting and ambulation. [x] (53566) Provided verbal/tactile cueing for activities related to improving balance, coordination, kinesthetic sense, posture, motor skill, proprioception  to assist with core control in self care, mobility, lifting, and ambulation.      Therapeutic Activities:    [] (79743 or 93536) Provided verbal/tactile cueing for activities related to improving balance, coordination, kinesthetic sense, posture, motor skill, proprioception and motor activation to allow for re-injury. [] Progressing: [] Met: [] Not Met: [] Adjusted   2. Patient will have a decrease in pain to facilitate improvement in movement, function, and ADLs as indicated by Functional Deficits. [] Progressing: [] Met: [] Not Met: [] Adjusted     Long Term Goals: To be achieved in: 12 weeks  1. Disability index score of 8% or less for the oswestry  to assist with reaching prior level of function. [] Progressing: [] Met: [] Not Met: [] Adjusted   2. Patient will demonstrate increased AROM to WNL, good LS mobility, good hip ROM to allow for proper joint functioning as indicated by patients Functional Deficits. [] Progressing: [] Met: [] Not Met: [] Adjusted   3. Patient will demonstrate an increase in Strength to good proximal hip and core activation to allow for proper functional mobility as indicated by patients Functional Deficits. [] Progressing: [] Met: [] Not Met: [] Adjusted   4. Patient will return to running 5+miles at a time without increased symptoms or restriction. [] Progressing: [] Met: [] Not Met: [] Adjusted   5. Patient will demonstrate appropriate mechanics with quatting/deadlift with no compensation to reduce risk of re-injury. [] Progressing: [] Met: [] Not Met: [] Adjusted      Overall Progression Towards Functional goals/ Treatment Progress Update:  [] Patient is progressing as expected towards functional goals listed. [] Progression is slowed due to complexities/Impairments listed. [] Progression has been slowed due to co-morbidities. [x] Plan just implemented, too soon to assess goals progression <30days   [] Goals require adjustment due to lack of progress  [] Patient is not progressing as expected and requires additional follow up with physician  [] Other    Prognosis for POC: [x] Good [] Fair  [] Poor      Patient requires continued skilled intervention: [x] Yes  [] No      ASSESSMENT:  Focused on glute and lateral hip stabilization along with TA activation.   Fatigue demonstrated and reported at end of the session. Treatment/Activity Tolerance:  [x] Patient tolerated treatment well [] Patient limited by fatigue  [] Patient limited by pain  [] Patient limited by other medical complications  [] Other:       PLAN: See eval  [x] Continue per plan of care [] Alter current plan (see comments above)  [] Plan of care initiated [] Hold pending MD visit [] Discharge      Electronically signed by:  Montse Jacobo PT , DPT [id number]    Note: If patient does not return for scheduled/ recommended follow up visits, this note will serve as a discharge from care along with most recent update on progress.

## 2021-10-06 ENCOUNTER — HOSPITAL ENCOUNTER (OUTPATIENT)
Dept: PHYSICAL THERAPY | Age: 21
Setting detail: THERAPIES SERIES
Discharge: HOME | End: 2021-10-06
Payer: COMMERCIAL

## 2021-10-06 PROCEDURE — 97110 THERAPEUTIC EXERCISES: CPT

## 2021-10-06 PROCEDURE — 97140 MANUAL THERAPY 1/> REGIONS: CPT

## 2021-10-06 PROCEDURE — 97112 NEUROMUSCULAR REEDUCATION: CPT

## 2021-10-06 NOTE — FLOWSHEET NOTE
[facility], [address] [place]  Phone: [phone]  Fax [phone]    Physical Therapy Daily Treatment Note  Date:  10/6/2021    Patient Name:  Krystal Irene    :  2000  MRN: [id number]  Restrictions/Precautions:    Physician Information:  Referring Practitioner: Erin Goss CNP  Medical/Treatment Diagnosis Information:  Diagnosis: M47.818 (ICD-10-CM) - SI joint arthritis  Treatment Diagnosis: SIJ pain left M25.552; generalized core/hip weakness M62.81(2); LBP M54.5  [x] Conservative / [] Surgical - DOS:  Therapy Diagnosis/Practice Pattern:  Practice Pattern F: Spinal Disorders  Insurance/Certification information:  PT Insurance Information:  [employer]  - 1000D-90/10-$0CP-PT/OT MED NEC -  [address] signed: [] YES  [x] NO  Number of Comorbidities:  []0     [x]1-2    []3+  Date of Patient follow up with Physician:     Is this a Progress Report:     []  Yes  [x]  No        If Yes:  Date Range for reporting period:  Beginning 2021  Ending     Progress report will be due (10 Rx or 30 days whichever is less):        Recertification will be due (POC Duration  / 90 days whichever is less): 12/10/2021      Latex Allergy:  [x]NO      []YES  Preferred Language for Healthcare:   [x]English       []other:    Visit # Insurance Allowable   3 BMN  auth [x]no        []yes:       SUBJECTIVE: Pt reports overall feeling pretty good. Doing exercises every day. Is starting to incorporate some form squats/deadlifts and machine work.      OBJECTIVE:    Observation:   Palpation:     Test used Initial score Current Score   Pain Summary VAS     Functional questionnaire Oswestry     ROM Lumbar Flexion      Lumbar Ext      Lumbar SB L/R      Lumbar rotation L/R     Strength Hip ext      ABD      TrA       RESTRICTIONS/PRECAUTIONS: none    Exercises/Interventions:     Therapeutic Ex/NMR re-education  sets/reps Notes HEP   Advanced LTR 10 x   X   [place] mob 10 x  X       X   X walk  [place] good morning  Anti rotation press  Anti rotation press with twist 2 laps  2 x 10  2 x 10  2 x 10 Red  Red  20#  20# X   Quadruped alt LE/ UE 15 x       Prone plank with hip ext 15 x R/L     RDL to goblet  Single leg RDL 20# KB 2 x 12  10# KB 2 x 12      FSU to march with runners arm       Hip hike/drop 15 x R/L Limited motion doing R. On L P! In QL (pt reports this is one of comparable signs)    Gliders post/diagonal 15 x each R/L           Pt education on PT progression with higher level function and goals, possible modalities and plan/prognosis 5 min     Manual Intervention       SIJ mobs gr 3-4, L STM piriformis, deep pressure to piriformis with hip IR in prone  Deep pressure to L QL 15 min     Lateral mulligan hip mobs       Assess left shoulder NV                    Access Code: [email or web address]  URL: [email or web address]  Date: 09/17/2021  Prepared by: Corrinne Ona    Exercises  Supine Lower Trunk Rotation - 1 x daily - 7 x weekly - 15 reps - 3-5 hold  [place] Pose - 1 x daily - 7 x weekly - 15 reps - 3-5 hold  Figure 4 Bridge - 1 x daily - 7 x weekly - 2-3 sets - 12 reps  Side Plank with Clam and Resistance - 1 x daily - 7 x weekly - 2-3 sets - 12 reps  X Band Walk - 1 x daily - 7 x weekly - 2-3 sets - 10 steps  Squatting Anti-Rotation Press - 1 x daily - 7 x weekly - 2-3 sets - 12 reps      Therapeutic Exercise and NMR EXR  [x] (05904) Provided verbal/tactile cueing for activities related to strengthening, flexibility, endurance, ROM  for improvements in proximal hip and core control with self care, mobility, lifting and ambulation. [x] (61010) Provided verbal/tactile cueing for activities related to improving balance, coordination, kinesthetic sense, posture, motor skill, proprioception  to assist with core control in self care, mobility, lifting, and ambulation.      Therapeutic Activities:    [] (90154 or ) Provided verbal/tactile cueing for activities related to improving balance, coordination, kinesthetic sense, posture, motor skill, proprioception and motor activation to allow for proper function  with self care and ADLs  [] (56635) Provided training and instruction to the patient for proper core and proximal hip recruitment and positioning with ambulation re-education     Home Exercise Program:    [x] (18348) Reviewed/Progressed HEP activities related to strengthening, flexibility, endurance, ROM of core, proximal hip and LE for functional self-care, mobility, lifting and ambulation   [] (61172) Reviewed/Progressed HEP activities related to improving balance, coordination, kinesthetic sense, posture, motor skill, proprioception of core, proximal hip and LE for self care, mobility, lifting, and ambulation      Manual Treatments:  PROM / STM / Oscillations-Mobs:  G-I, II, III, IV (PA's, Inf., Post.)  [x] (07803) Provided manual therapy to mobilize proximal hip and LS spine soft tissue/joints for the purpose of modulating pain, promoting relaxation,  increasing ROM, reducing/eliminating soft tissue swelling/inflammation/restriction, improving soft tissue extensibility and allowing for proper ROM for normal function with self care, mobility, lifting and ambulation.      Modalities:      [] GR/ESU 15 min    [] GR 15 min  [] ESU     [] CP    [] MHP    [x] declined  Charges:  Timed Code Treatment Minutes: 55   Total Treatment Minutes: 55     [] EVAL (LOW) 08442 (typically 20 minutes face-to-face)  [] EVAL (MOD) 13788 (typically 30 minutes face-to-face)  [] EVAL (HIGH) 51744 (typically 45 minutes face-to-face)  [] RE-EVAL     [x] GJ(01066) x  2 [] IONTO  [x] NMR (98273) x  1   [] VASO  [x] Manual (55354) x 1     [] Other:  [] TA x      [] Mech Traction (67924)  [] ES(attended) (11491)      [] ES (un) (86537):     Goals: Patient stated goal: Pain free return to powerlifting and running  [] Progressing: [] Met: [] Not Met: [] Adjusted     Therapist goals for Patient:   Short Term Goals: To be achieved in: 2 weeks  1. Independent in HEP and progression per patient tolerance, in order to prevent re-injury. [] Progressing: [] Met: [] Not Met: [] Adjusted   2. Patient will have a decrease in pain to facilitate improvement in movement, function, and ADLs as indicated by Functional Deficits. [] Progressing: [] Met: [] Not Met: [] Adjusted     Long Term Goals: To be achieved in: 12 weeks  1. Disability index score of 8% or less for the oswestry  to assist with reaching prior level of function. [] Progressing: [] Met: [] Not Met: [] Adjusted   2. Patient will demonstrate increased AROM to WNL, good LS mobility, good hip ROM to allow for proper joint functioning as indicated by patients Functional Deficits. [] Progressing: [] Met: [] Not Met: [] Adjusted   3. Patient will demonstrate an increase in Strength to good proximal hip and core activation to allow for proper functional mobility as indicated by patients Functional Deficits. [] Progressing: [] Met: [] Not Met: [] Adjusted   4. Patient will return to running 5+miles at a time without increased symptoms or restriction. [] Progressing: [] Met: [] Not Met: [] Adjusted   5. Patient will demonstrate appropriate mechanics with quatting/deadlift with no compensation to reduce risk of re-injury. [] Progressing: [] Met: [] Not Met: [] Adjusted      Overall Progression Towards Functional goals/ Treatment Progress Update:  [] Patient is progressing as expected towards functional goals listed. [] Progression is slowed due to complexities/Impairments listed. [] Progression has been slowed due to co-morbidities.   [x] Plan just implemented, too soon to assess goals progression <30days   [] Goals require adjustment due to lack of progress  [] Patient is not progressing as expected and requires additional follow up with physician  [] Other    Prognosis for POC: [x] Good [] Fair  [] Poor      Patient requires continued skilled intervention: [x] Yes  [] No      ASSESSMENT:  Pt with increased pain during hip hike/drop in L QL, performed manual at end of session and was very symptomatic with taut band in L QL, may benefit from dry needling. Continues to require cueing for core and glute engagement. Good depth with RDLs and squats, does have increase in lordosis at end range. Treatment/Activity Tolerance:  [x] Patient tolerated treatment well [] Patient limited by fatigue  [] Patient limited by pain  [] Patient limited by other medical complications  [] Other:       PLAN: See eval  [x] Continue per plan of care [] Alter current plan (see comments above)  [] Plan of care initiated [] Hold pending MD visit [] Discharge      Electronically signed by:  Maral Parson, PT , DPT     Note: If patient does not return for scheduled/ recommended follow up visits, this note will serve as a discharge from care along with most recent update on progress.

## 2021-10-08 ENCOUNTER — HOSPITAL ENCOUNTER (OUTPATIENT)
Dept: PHYSICAL THERAPY | Age: 21
Setting detail: THERAPIES SERIES
Discharge: HOME | End: 2021-10-08
Payer: COMMERCIAL

## 2021-10-08 PROCEDURE — 97140 MANUAL THERAPY 1/> REGIONS: CPT

## 2021-10-08 PROCEDURE — 97112 NEUROMUSCULAR REEDUCATION: CPT

## 2021-10-08 PROCEDURE — 97110 THERAPEUTIC EXERCISES: CPT

## 2021-10-08 NOTE — FLOWSHEET NOTE
[facility], [address] [place]  Phone: [phone]  Fax [phone]    Physical Therapy Daily Treatment Note  Date:  10/8/2021    Patient Name:  Gallo Robb    :  2000  MRN: [id number]  Restrictions/Precautions:    Physician Information:  Referring Practitioner: Bruna Garcia CNP  Medical/Treatment Diagnosis Information:  Diagnosis: M47.818 (ICD-10-CM) - SI joint arthritis  Treatment Diagnosis: SIJ pain left M25.552; generalized core/hip weakness M62.81(2); LBP M54.5  [x] Conservative / [] Surgical - DOS:  Therapy Diagnosis/Practice Pattern:  Practice Pattern F: Spinal Disorders  Insurance/Certification information:  PT Insurance Information:  [employer] [place]  - 1000D-90/10-$0CP-PT/OT MED NEC -  [address] signed: [] YES  [x] NO  Number of Comorbidities:  []0     [x]1-2    []3+  Date of Patient follow up with Physician:     Is this a Progress Report:     []  Yes  [x]  No        If Yes:  Date Range for reporting period:  Beginning 2021  Ending     Progress report will be due (10 Rx or 30 days whichever is less):        Recertification will be due (POC Duration  / 90 days whichever is less): 12/10/2021      Latex Allergy:  [x]NO      []YES  Preferred Language for Healthcare:   [x]English       []other:    Visit # Insurance Allowable   4 BMN  auth [x]no        []yes:       SUBJECTIVE: Pt reports that he has been feeling a little better, slowly working volume and load back into his weight training. Has done bar squats without added weight but feels no increase in symptoms.     OBJECTIVE:    Observation:   Palpation:     Test used Initial score Current Score   Pain Summary VAS     Functional questionnaire Oswestry     ROM Lumbar Flexion      Lumbar Ext      Lumbar SB L/R      Lumbar rotation L/R     Strength Hip ext      ABD      TrA       RESTRICTIONS/PRECAUTIONS: none    Exercises/Interventions:     Therapeutic Ex/NMR re-education  sets/reps Notes HEP   Advanced LTR 10 x   X   [place] mob  Hamstring Flossing 12 x  x10 R/L  X   Supine tap downs with KB hold 2x10 20#    X walk  High hold good morning  CC Anti rotation Walk outs  Anti rotation press with twist 2 laps  3x15  x6 ea way   Red  Blue  60#  20# X   Quadruped alt LE/ UE 15 x       Prone plank with hip ext 15 x R/L           Hip hike/drop 15 x R/L Limited motion doing R. On L P! In QL (pt reports this is one of comparable signs)       Goblet squat pause on BOSU 2x to failure 20# KB; Very tough on his control    Pt education on PT progression with higher level function and goals, possible modalities and plan/prognosis 5 min     Manual Intervention       SIJ mobs gr 3-4, L STM piriformis, deep pressure to piriformis with hip IR in prone  Deep pressure to L QL 10 min     Lateral mulligan hip mobs       Assess left shoulder NV                    Access Code: [email or web address]  URL: [email or web address]  Date: 09/17/2021  Prepared by: Frandy Villela    Exercises  Supine Lower Trunk Rotation - 1 x daily - 7 x weekly - 15 reps - 3-5 hold  [place] Pose - 1 x daily - 7 x weekly - 15 reps - 3-5 hold  Figure 4 Bridge - 1 x daily - 7 x weekly - 2-3 sets - 12 reps  Side Plank with Clam and Resistance - 1 x daily - 7 x weekly - 2-3 sets - 12 reps  X Band Walk - 1 x daily - 7 x weekly - 2-3 sets - 10 steps  Squatting Anti-Rotation Press - 1 x daily - 7 x weekly - 2-3 sets - 12 reps      Therapeutic Exercise and NMR EXR  [x] (82867) Provided verbal/tactile cueing for activities related to strengthening, flexibility, endurance, ROM  for improvements in proximal hip and core control with self care, mobility, lifting and ambulation.   [x] (33093) Provided verbal/tactile cueing for activities related to improving balance, coordination, kinesthetic sense, posture, motor skill, proprioception  to assist with core control in self care, mobility, lifting, and ambulation. Therapeutic Activities:    [] (70461 or 81457) Provided verbal/tactile cueing for activities related to improving balance, coordination, kinesthetic sense, posture, motor skill, proprioception and motor activation to allow for proper function  with self care and ADLs  [] (38928) Provided training and instruction to the patient for proper core and proximal hip recruitment and positioning with ambulation re-education     Home Exercise Program:    [x] (17921) Reviewed/Progressed HEP activities related to strengthening, flexibility, endurance, ROM of core, proximal hip and LE for functional self-care, mobility, lifting and ambulation   [] (33501) Reviewed/Progressed HEP activities related to improving balance, coordination, kinesthetic sense, posture, motor skill, proprioception of core, proximal hip and LE for self care, mobility, lifting, and ambulation      Manual Treatments:  PROM / STM / Oscillations-Mobs:  G-I, II, III, IV (PA's, Inf., Post.)  [x] (94156) Provided manual therapy to mobilize proximal hip and LS spine soft tissue/joints for the purpose of modulating pain, promoting relaxation,  increasing ROM, reducing/eliminating soft tissue swelling/inflammation/restriction, improving soft tissue extensibility and allowing for proper ROM for normal function with self care, mobility, lifting and ambulation.      Modalities:      [] GR/ESU 15 min    [] GR 15 min  [] ESU     [] CP    [] MHP    [x] declined  Charges:  Timed Code Treatment Minutes: 55   Total Treatment Minutes: 55     [] EVAL (LOW) 93204 (typically 20 minutes face-to-face)  [] EVAL (MOD) 79766 (typically 30 minutes face-to-face)  [] EVAL (HIGH) 51141 (typically 45 minutes face-to-face)  [] RE-EVAL     [x] PZ(97453) x  2 [] IONTO  [x] NMR (95752) x  1   [] VASO  [x] Manual (31594) x 1     [] Other:  [] TA x      [] Mech Traction (75894)  [] ES(attended) (23831)      [] ES (un) (82274):     Goals: Patient stated goal: Pain free return to powerlifting and running  [] Progressing: [] Met: [] Not Met: [] Adjusted     Therapist goals for Patient:   Short Term Goals: To be achieved in: 2 weeks  1. Independent in HEP and progression per patient tolerance, in order to prevent re-injury. [x] Progressing: [] Met: [] Not Met: [] Adjusted   2. Patient will have a decrease in pain to facilitate improvement in movement, function, and ADLs as indicated by Functional Deficits. [x] Progressing: [] Met: [] Not Met: [] Adjusted     Long Term Goals: To be achieved in: 12 weeks  1. Disability index score of 8% or less for the oswestry  to assist with reaching prior level of function. [x] Progressing: [] Met: [] Not Met: [] Adjusted   2. Patient will demonstrate increased AROM to WNL, good LS mobility, good hip ROM to allow for proper joint functioning as indicated by patients Functional Deficits. [x] Progressing: [] Met: [] Not Met: [] Adjusted   3. Patient will demonstrate an increase in Strength to good proximal hip and core activation to allow for proper functional mobility as indicated by patients Functional Deficits. [x] Progressing: [] Met: [] Not Met: [] Adjusted   4. Patient will return to running 5+miles at a time without increased symptoms or restriction. [x] Progressing: [] Met: [] Not Met: [] Adjusted   5. Patient will demonstrate appropriate mechanics with quatting/deadlift with no compensation to reduce risk of re-injury. [x] Progressing: [] Met: [] Not Met: [] Adjusted      Overall Progression Towards Functional goals/ Treatment Progress Update:  [x] Patient is progressing as expected towards functional goals listed. [] Progression is slowed due to complexities/Impairments listed. [] Progression has been slowed due to co-morbidities.   [] Plan just implemented, too soon to assess goals progression <30days   [] Goals require adjustment due to lack of progress  [] Patient is not progressing as expected and requires additional follow up with physician  [] Other    Prognosis for POC: [x] Good [] Fair  [] Poor      Patient requires continued skilled intervention: [x] Yes  [] No      ASSESSMENT:  Job Garcias tolerated increased volume without an increase in symptoms. He did have a lot of control and stability issues when challenged on the BOSU ball. He would continue to benefit from skilled physical therapy to maximize his functional outcomes and progress towards goals. Treatment/Activity Tolerance:  [x] Patient tolerated treatment well [] Patient limited by fatigue  [] Patient limited by pain  [] Patient limited by other medical complications  [] Other:       PLAN:   [x] Continue per plan of care [] Alter current plan (see comments above)  [] Plan of care initiated [] Hold pending MD visit [] Discharge      Electronically signed by:  Moises Farrar PT , DPT     Note: If patient does not return for scheduled/ recommended follow up visits, this note will serve as a discharge from care along with most recent update on progress.

## 2021-10-11 ENCOUNTER — HOSPITAL ENCOUNTER (OUTPATIENT)
Dept: PHYSICAL THERAPY | Age: 21
Setting detail: THERAPIES SERIES
Discharge: HOME | End: 2021-10-11
Payer: COMMERCIAL

## 2021-10-11 PROCEDURE — 97140 MANUAL THERAPY 1/> REGIONS: CPT

## 2021-10-11 PROCEDURE — 97112 NEUROMUSCULAR REEDUCATION: CPT

## 2021-10-11 PROCEDURE — 97110 THERAPEUTIC EXERCISES: CPT

## 2021-10-11 NOTE — FLOWSHEET NOTE
[facility],  [address] [place]  Phone: [phone]  Fax [phone]    Physical Therapy Daily Treatment Note  Date:  10/11/2021    Patient Name:  Franky Jewell    :  2000  MRN: [id number]  Restrictions/Precautions:    Physician Information:  Referring Practitioner: Yasmeen Winston CNP  Medical/Treatment Diagnosis Information:  Diagnosis: M47.818 (ICD-10-CM) - SI joint arthritis  Treatment Diagnosis: SIJ pain left M25.552; generalized core/hip weakness M62.81(2); LBP M54.5  [x] Conservative / [] Surgical - DOS:  Therapy Diagnosis/Practice Pattern:  Practice Pattern F: Spinal Disorders  Insurance/Certification information:  PT Insurance Information:  [employer] [place]  - 1000D-90/10-$0CP-PT/OT MED NEC -  [address] signed: [] YES  [x] NO  Number of Comorbidities:  []0     [x]1-2    []3+  Date of Patient follow up with Physician:     Is this a Progress Report:     []  Yes  [x]  No        If Yes:  Date Range for reporting period:  Beginning 2021  Ending     Progress report will be due (10 Rx or 30 days whichever is less):        Recertification will be due (POC Duration  / 90 days whichever is less): 12/10/2021      Latex Allergy:  [x]NO      []YES  Preferred Language for Healthcare:   [x]English       []other:    Visit # Insurance Allowable   5 BMN  auth [x]no        []yes:       SUBJECTIVE: Pt reports that he is a little more stiff today and thinks it is just from how he slept last night.     Is interested in Dry needling    OBJECTIVE:    Observation:   Palpation:     Test used Initial score Current Score   Pain Summary VAS     Functional questionnaire Oswestry     ROM Lumbar Flexion      Lumbar Ext      Lumbar SB L/R      Lumbar rotation L/R     Strength Hip ext      ABD      TrA       RESTRICTIONS/PRECAUTIONS: none    Exercises/Interventions:     Therapeutic Ex/NMR re-education sets/reps Notes HEP   Advanced LTR 10 x   X   [place] mob  Hamstring Flossing 12 x  x10 R/L  X   Supine tap downs with KB hold 2x10 20#    X walk  High hold good morning  CC Anti rotation Walk outs  Anti rotation press with twist 4 laps  3x15  2x8 ea way   Red  Blue  60#  20# X         RDL to gobletSingle leg RDL20# KB 2 x 1210# KB 2 x 12 On BOSU - corrected left sided dip    Reverse hyper's off [email or web address]   Hip hike/drop 2x15 R/L Limited motion doing R. On L P! In QL (pt reports this is one of comparable signs)             Pt education on PT progression with higher level function and goals, possible modalities and plan/prognosis 5 min     Manual Intervention       SIJ mobs gr 3-4, L STM piriformis, deep pressure to piriformis with hip IR in prone  Deep pressure to L QL 10 min     Lateral mulligan hip mobs       Assess left shoulder NV            Potentially dry needle next visit        Access Code: [email or web address]  URL: [email or web address]  Date: 09/17/2021  Prepared by: Corrinne Ona    Exercises  Supine Lower Trunk Rotation - 1 x daily - 7 x weekly - 15 reps - 3-5 hold  Malvern Pose - 1 x daily - 7 x weekly - 15 reps - 3-5 hold  Figure 4 Bridge - 1 x daily - 7 x weekly - 2-3 sets - 12 reps  Side Plank with Clam and Resistance - 1 x daily - 7 x weekly - 2-3 sets - 12 reps  X Band Walk - 1 x daily - 7 x weekly - 2-3 sets - 10 steps  Squatting Anti-Rotation Press - 1 x daily - 7 x weekly - 2-3 sets - 12 reps      Therapeutic Exercise and NMR EXR  [x] (46286) Provided verbal/tactile cueing for activities related to strengthening, flexibility, endurance, ROM  for improvements in proximal hip and core control with self care, mobility, lifting and ambulation. [x] (58803) Provided verbal/tactile cueing for activities related to improving balance, coordination, kinesthetic sense, posture, motor skill, proprioception  to assist with core control in self care, mobility, lifting, and ambulation.      Therapeutic Activities: [] (32761 or 69383) Provided verbal/tactile cueing for activities related to improving balance, coordination, kinesthetic sense, posture, motor skill, proprioception and motor activation to allow for proper function  with self care and ADLs  [] (59626) Provided training and instruction to the patient for proper core and proximal hip recruitment and positioning with ambulation re-education     Home Exercise Program:    [x] (72020) Reviewed/Progressed HEP activities related to strengthening, flexibility, endurance, ROM of core, proximal hip and LE for functional self-care, mobility, lifting and ambulation   [] (07820) Reviewed/Progressed HEP activities related to improving balance, coordination, kinesthetic sense, posture, motor skill, proprioception of core, proximal hip and LE for self care, mobility, lifting, and ambulation      Manual Treatments:  PROM / STM / Oscillations-Mobs:  G-I, II, III, IV (PA's, Inf., Post.)  [x] (02242) Provided manual therapy to mobilize proximal hip and LS spine soft tissue/joints for the purpose of modulating pain, promoting relaxation,  increasing ROM, reducing/eliminating soft tissue swelling/inflammation/restriction, improving soft tissue extensibility and allowing for proper ROM for normal function with self care, mobility, lifting and ambulation.      Modalities:      [] GR/ESU 15 min    [] GR 15 min  [] ESU     [] CP    [] MHP    [x] declined  Charges:  Timed Code Treatment Minutes: 55   Total Treatment Minutes: 55     [] EVAL (LOW) 55248 (typically 20 minutes face-to-face)  [] EVAL (MOD) 37890 (typically 30 minutes face-to-face)  [] EVAL (HIGH) 30998 (typically 45 minutes face-to-face)  [] RE-EVAL     [x] UL(98257) x  2 [] IONTO  [x] NMR (94364) x  1   [] VASO  [x] Manual (51298) x 1     [] Other:  [] TA x      [] Mech Traction (37333)  [] ES(attended) (24652)      [] ES (un) (79706):     Goals: Patient stated goal: Pain free return to powerlifting and running  [x] Progressing: [] Met: [] Not Met: [] Adjusted     Therapist goals for Patient:   Short Term Goals: To be achieved in: 2 weeks  1. Independent in HEP and progression per patient tolerance, in order to prevent re-injury. [x] Progressing: [] Met: [] Not Met: [] Adjusted   2. Patient will have a decrease in pain to facilitate improvement in movement, function, and ADLs as indicated by Functional Deficits. [x] Progressing: [] Met: [] Not Met: [] Adjusted     Long Term Goals: To be achieved in: 12 weeks  1. Disability index score of 8% or less for the oswestry  to assist with reaching prior level of function. [x] Progressing: [] Met: [] Not Met: [] Adjusted   2. Patient will demonstrate increased AROM to WNL, good LS mobility, good hip ROM to allow for proper joint functioning as indicated by patients Functional Deficits. [x] Progressing: [] Met: [] Not Met: [] Adjusted   3. Patient will demonstrate an increase in Strength to good proximal hip and core activation to allow for proper functional mobility as indicated by patients Functional Deficits. [x] Progressing: [] Met: [] Not Met: [] Adjusted   4. Patient will return to running 5+miles at a time without increased symptoms or restriction. [x] Progressing: [] Met: [] Not Met: [] Adjusted   5. Patient will demonstrate appropriate mechanics with quatting/deadlift with no compensation to reduce risk of re-injury. [x] Progressing: [] Met: [] Not Met: [] Adjusted      Overall Progression Towards Functional goals/ Treatment Progress Update:  [x] Patient is progressing as expected towards functional goals listed. [] Progression is slowed due to complexities/Impairments listed. [] Progression has been slowed due to co-morbidities.   [] Plan just implemented, too soon to assess goals progression <30days   [] Goals require adjustment due to lack of progress  [] Patient is not progressing as expected and requires additional follow up with physician  [] Other    Prognosis for

## 2021-10-15 ENCOUNTER — HOSPITAL ENCOUNTER (OUTPATIENT)
Dept: PHYSICAL THERAPY | Age: 21
Setting detail: THERAPIES SERIES
Discharge: HOME | End: 2021-10-15
Payer: COMMERCIAL

## 2021-10-15 PROCEDURE — 20560 NDL INSJ W/O NJX 1 OR 2 MUSC: CPT

## 2021-10-15 PROCEDURE — 97140 MANUAL THERAPY 1/> REGIONS: CPT

## 2021-10-15 PROCEDURE — 97110 THERAPEUTIC EXERCISES: CPT

## 2021-10-15 NOTE — FLOWSHEET NOTE
[facility],  [address] [place]  Phone: [phone]  Fax [phone]    Physical Therapy Daily Treatment Note  Date:  10/15/2021    Patient Name:  Vita Jain    :  2000  MRN: [id number]  Restrictions/Precautions:    Physician Information:  Referring Practitioner: Georgie Cano CNP  Medical/Treatment Diagnosis Information:  Diagnosis: M47.818 (ICD-10-CM) - SI joint arthritis  Treatment Diagnosis: SIJ pain left M25.552; generalized core/hip weakness M62.81(2); LBP M54.5  [x] Conservative / [] Surgical - DOS:  Therapy Diagnosis/Practice Pattern:  Practice Pattern F: Spinal Disorders  Insurance/Certification information:  PT Insurance Information:  MEDICAL [place]  - 1000D-90/10-$0CP-PT/OT MED NEC -  [address] signed: [] YES  [x] NO  Number of Comorbidities:  []0     [x]1-2    []3+  Date of Patient follow up with Physician:     Is this a Progress Report:     []  Yes  [x]  No        If Yes:  Date Range for reporting period:  Beginning 2021  Ending     Progress report will be due (10 Rx or 30 days whichever is less):        Recertification will be due (POC Duration  / 90 days whichever is less): 12/10/2021      Latex Allergy:  [x]NO      []YES  Preferred Language for Healthcare:   [x]English       []other:    Visit # Insurance Allowable   6 BMN  auth [x]no        []yes:       SUBJECTIVE: Pt reports that he is a little more stiff today eager to try needling today for his QL      OBJECTIVE:    Observation:   Palpation:     Test used Initial score Current Score   Pain Summary VAS 2-3 0-1   Functional questionnaire Oswestry     ROM Lumbar Flexion      Lumbar Ext      Lumbar SB L/R      Lumbar rotation L/R     Strength Hip ext      ABD      TrA       RESTRICTIONS/PRECAUTIONS: none    Exercises/Interventions:     Therapeutic Ex/NMR re-education  sets/reps Notes HEP   Rasheed pose Site 5   [] Pistoning / []  Threading  []  Winding/Coning  []    Site 6   [] Pistoning / []  Threading  []  Winding/Coning  []    Site 7   [] Pistoning / []  Threading  []  Winding/Coning  []    Site 8   [] Pistoning / []  Threading  []  Winding/Coning  []    Site 9   [] Pistoning / []  Threading  []  Winding/Coning  []    Site 10   [] Pistoning / []  Threading  []  Winding/Coning  []      **The above techniques were used to restore functional range of motion, reduce muscle spasm, and induce healing in the corresponding musculature by means of intramuscular mobilization. Clean Technique was utilized today while applying the Dry needling treatment. The treatment sites where cleaned with 70% solution of isopropyl alcohol. The PT washed their hands and utilized treatment gloves along with hand  prior to inserting the needles. All needles where removed and discarded in the appropriate sharps container. MD has given verbal and/or written approval for this treatment. **    ** Educated patient on anatomy, trigger point etiology, expectations for TDN (bruising, soreness, etc), outcomes, and recommendations for exercise. **      Therapeutic Exercise and NMR EXR  [x] (81000) Provided verbal/tactile cueing for activities related to strengthening, flexibility, endurance, ROM  for improvements in proximal hip and core control with self care, mobility, lifting and ambulation. [x] (47417) Provided verbal/tactile cueing for activities related to improving balance, coordination, kinesthetic sense, posture, motor skill, proprioception  to assist with core control in self care, mobility, lifting, and ambulation.      Therapeutic Activities:    [] (61865 or 25759) Provided verbal/tactile cueing for activities related to improving balance, coordination, kinesthetic sense, posture, motor skill, proprioception and motor activation to allow for proper function  with self care and ADLs  [] (01613) Provided training and instruction to the patient for proper core and proximal hip recruitment and positioning with ambulation re-education     Home Exercise Program:    [x] (99083) Reviewed/Progressed HEP activities related to strengthening, flexibility, endurance, ROM of core, proximal hip and LE for functional self-care, mobility, lifting and ambulation   [] (02307) Reviewed/Progressed HEP activities related to improving balance, coordination, kinesthetic sense, posture, motor skill, proprioception of core, proximal hip and LE for self care, mobility, lifting, and ambulation      Manual Treatments:  PROM / STM / Oscillations-Mobs:  G-I, II, III, IV (PA's, Inf., Post.)  [x] (13138) Provided manual therapy to mobilize proximal hip and LS spine soft tissue/joints for the purpose of modulating pain, promoting relaxation,  increasing ROM, reducing/eliminating soft tissue swelling/inflammation/restriction, improving soft tissue extensibility and allowing for proper ROM for normal function with self care, mobility, lifting and ambulation. Modalities:      [] GR/ESU 15 min    [] GR 15 min  [] ESU     [] CP    [] MHP    [x] declined    Charges:  Timed Code Treatment Minutes: 40   Total Treatment Minutes: 45     [] EVAL (LOW) 07130 (typically 20 minutes face-to-face)  [] EVAL (MOD) 01276 (typically 30 minutes face-to-face)  [] EVAL (HIGH) 17648 (typically 45 minutes face-to-face)  [] RE-EVAL     [x] ZS(75665) x  2 [] IONTO  [] NMR (92158) x     [] VASO  [x] Manual (81549) x 1     [] Other:  [] TA x      [] Mech Traction (78108)  [] ES(attended) (18682)      [] ES (un) (33467):      Trigger point Dry Needling:  fine needle insertion into myofascial trigger points to stimulate a healing response  [x] (66861) Needle insertion without injection: 1 or 2 muscles  [] (29733) Needle insertion without injection: 3 or more muscles      Goals: Patient stated goal: Pain free return to powerlifting and running  [x] Progressing: [] Met: [] Not Met: [] Adjusted     Therapist goals for Patient:   Short Term Goals: To be achieved in: 2 weeks  1. Independent in HEP and progression per patient tolerance, in order to prevent re-injury. [x] Progressing: [] Met: [] Not Met: [] Adjusted   2. Patient will have a decrease in pain to facilitate improvement in movement, function, and ADLs as indicated by Functional Deficits. [x] Progressing: [] Met: [] Not Met: [] Adjusted     Long Term Goals: To be achieved in: 12 weeks  1. Disability index score of 8% or less for the oswestry  to assist with reaching prior level of function. [x] Progressing: [] Met: [] Not Met: [] Adjusted   2. Patient will demonstrate increased AROM to WNL, good LS mobility, good hip ROM to allow for proper joint functioning as indicated by patients Functional Deficits. [x] Progressing: [] Met: [] Not Met: [] Adjusted   3. Patient will demonstrate an increase in Strength to good proximal hip and core activation to allow for proper functional mobility as indicated by patients Functional Deficits. [x] Progressing: [] Met: [] Not Met: [] Adjusted   4. Patient will return to running 5+miles at a time without increased symptoms or restriction. [x] Progressing: [] Met: [] Not Met: [] Adjusted   5. Patient will demonstrate appropriate mechanics with quatting/deadlift with no compensation to reduce risk of re-injury. [x] Progressing: [] Met: [] Not Met: [] Adjusted      Overall Progression Towards Functional goals/ Treatment Progress Update:  [x] Patient is progressing as expected towards functional goals listed. [] Progression is slowed due to complexities/Impairments listed. [] Progression has been slowed due to co-morbidities.   [] Plan just implemented, too soon to assess goals progression <30days   [] Goals require adjustment due to lack of progress  [] Patient is not progressing as expected and requires additional follow up with physician  [] Other    Prognosis for POC: [x] Good [] Fair  [] Poor      Patient requires continued skilled intervention: [x] Yes  [] No      ASSESSMENT:  Alcira Nelson continued to tolerate increased volume without an increase in symptoms. He demonstrated some poor activation patterns when challenged on the bosu with deadlifts - dipping his left hip consistently likely to offset his right side. He displayed similar patterns with single leg squats although was able to correct both exercises with cueing. He would continue to benefit from skilled physical therapy to maximize his functional outcomes and progress towards goals. Treatment/Activity Tolerance:  [x] Patient tolerated treatment well [] Patient limited by fatigue  [] Patient limited by pain  [] Patient limited by other medical complications  [] Other:       PLAN:   [x] Continue per plan of care [] Alter current plan (see comments above)  [] Plan of care initiated [] Hold pending MD visit [] Discharge      Electronically signed by:  Michelle Hyatt, PT , DPT     Note: If patient does not return for scheduled/ recommended follow up visits, this note will serve as a discharge from care along with most recent update on progress.

## 2021-10-18 ENCOUNTER — HOSPITAL ENCOUNTER (OUTPATIENT)
Dept: PHYSICAL THERAPY | Age: 21
Setting detail: THERAPIES SERIES
Discharge: HOME | End: 2021-10-18
Payer: COMMERCIAL

## 2021-10-18 PROCEDURE — 97110 THERAPEUTIC EXERCISES: CPT

## 2021-10-18 PROCEDURE — 97140 MANUAL THERAPY 1/> REGIONS: CPT

## 2021-10-18 NOTE — FLOWSHEET NOTE
[facility], [address] [place]  Phone: [phone]  Fax [phone]    Physical Therapy Daily Treatment Note  Date:  10/18/2021    Patient Name:  Jagdish Dotson    :  2000  MRN: [id number]  Restrictions/Precautions:    Physician Information:  Referring Practitioner: Bhavin Baltazar CNP  Medical/Treatment Diagnosis Information:  Diagnosis: M47.818 (ICD-10-CM) - SI joint arthritis  Treatment Diagnosis: SIJ pain left M25.552; generalized core/hip weakness M62.81(2); LBP M54.5  [x] Conservative / [] Surgical - DOS:  Therapy Diagnosis/Practice Pattern:  Practice Pattern F: Spinal Disorders  Insurance/Certification information:  PT Insurance Information:  [employer] [place]  - 1000D-90/10-$0CP-PT/OT MED NEC -  [address] signed: [] YES  [x] NO  Number of Comorbidities:  []0     [x]1-2    []3+  Date of Patient follow up with Physician:     Is this a Progress Report:     []  Yes  [x]  No        If Yes:  Date Range for reporting period:  Beginning 2021  Ending     Progress report will be due (10 Rx or 30 days whichever is less):        Recertification will be due (POC Duration  / 90 days whichever is less): 12/10/2021      Latex Allergy:  [x]NO      []YES  Preferred Language for Healthcare:   [x]English       []other:    Visit # Insurance Allowable   7 BMN  auth [x]no        []yes:       SUBJECTIVE: Pt reports that he has made some lifestyle changes over the past few days that have allowed him to get higher quality sleep however it is causing him to feel a little run down today.       OBJECTIVE:    Observation:   Palpation:     Test used Initial score Current Score   Pain Summary VAS 2-3 0-1   Functional questionnaire Oswestry     ROM Lumbar Flexion      Lumbar Ext      Lumbar SB L/R      Lumbar rotation L/R     Strength Hip ext      ABD      TrA       RESTRICTIONS/PRECAUTIONS: none    Exercises/Interventions:     Therapeutic Ex/NMR re-education  sets/reps Notes HEP   Rasheed pose 3D  Thread the needle to open book 3 x 15\"  15 x R/L     Advanced LTR 10 x   X   [place] mob  Hamstring Flossing 12 x  x10 R/L  X   Supine tap downs with KB hold  20#    X walk  High hold good morning  CC Anti rotation Walk outs  Anti rotation press with twist 4 laps  3x10  2x10 ea way  2 x 10 Red  Blue  35#  30# X         RDL to gobletSingle leg RDLOn BOSU - corrected left sided dip    Reverse hyper's off [id number]   Hip hike/drop Limited motion doing R. On L P! In QL (pt reports this is one of comparable signs)            Pt education on PT progression with higher level function and goals, possible modalities and plan/prognosis     Manual Intervention       SIJ mobs gr 3-4, L STM piriformis, deep pressure to piriformis with hip IR in prone  Deep pressure to L QL 10 min     Lateral mulligan hip mobs       lumbopelvic manipulation 3'             Access Code: [email or web address]  URL: [email or web address]  Date: 09/17/2021  Prepared by: Kimi Ba    Exercises  Supine Lower Trunk Rotation - 1 x daily - 7 x weekly - 15 reps - 3-5 hold  [place] Pose - 1 x daily - 7 x weekly - 15 reps - 3-5 hold  Figure 4 Bridge - 1 x daily - 7 x weekly - 2-3 sets - 12 reps  Side Plank with Clam and Resistance - 1 x daily - 7 x weekly - 2-3 sets - 12 reps  X Band Walk - 1 x daily - 7 x weekly - 2-3 sets - 10 steps  Squatting Anti-Rotation Press - 1 x daily - 7 x weekly - 2-3 sets - 12 reps    NO DRY NEEDLING 10/18/21  Consent signed 10/15/2020 and precautions addressed. See media tab.    Muscle  Needle Size Technique Notes IES   Site 1 QL on R 3X .3x75mm [x] Pistoning / []  Threading  [x]  Winding/Coning Increased tissue resistance throughout, release last needle []    Site 2 Lumbar multifidi on R 3X .3x60mm [x] Pistoning / []  Threading  [x]  Winding/Coning Increased tissue resistance throughout []    Site 3   [] Pistoning / [] Threading  []  Winding/Coning  []    Site 4   [] Pistoning / []  Threading  []  Winding/Coning  []    Site 5   [] Pistoning / []  Threading  []  Winding/Coning  []    Site 6   [] Pistoning / []  Threading  []  Winding/Coning  []    Site 7   [] Pistoning / []  Threading  []  Winding/Coning  []    Site 8   [] Pistoning / []  Threading  []  Winding/Coning  []    Site 9   [] Pistoning / []  Threading  []  Winding/Coning  []    Site 10   [] Pistoning / []  Threading  []  Winding/Coning  []      **The above techniques were used to restore functional range of motion, reduce muscle spasm, and induce healing in the corresponding musculature by means of intramuscular mobilization. Clean Technique was utilized today while applying the Dry needling treatment. The treatment sites where cleaned with 70% solution of isopropyl alcohol. The PT washed their hands and utilized treatment gloves along with hand  prior to inserting the needles. All needles where removed and discarded in the appropriate sharps container. MD has given verbal and/or written approval for this treatment. **    ** Educated patient on anatomy, trigger point etiology, expectations for TDN (bruising, soreness, etc), outcomes, and recommendations for exercise. **      Therapeutic Exercise and NMR EXR  [x] (63477) Provided verbal/tactile cueing for activities related to strengthening, flexibility, endurance, ROM  for improvements in proximal hip and core control with self care, mobility, lifting and ambulation. [x] (07793) Provided verbal/tactile cueing for activities related to improving balance, coordination, kinesthetic sense, posture, motor skill, proprioception  to assist with core control in self care, mobility, lifting, and ambulation.      Therapeutic Activities:    [] (77522 or 97925) Provided verbal/tactile cueing for activities related to improving balance, coordination, kinesthetic sense, posture, motor skill, proprioception and motor activation to allow for proper function  with self care and ADLs  [] (89242) Provided training and instruction to the patient for proper core and proximal hip recruitment and positioning with ambulation re-education     Home Exercise Program:    [x] (97902) Reviewed/Progressed HEP activities related to strengthening, flexibility, endurance, ROM of core, proximal hip and LE for functional self-care, mobility, lifting and ambulation   [] (90865) Reviewed/Progressed HEP activities related to improving balance, coordination, kinesthetic sense, posture, motor skill, proprioception of core, proximal hip and LE for self care, mobility, lifting, and ambulation      Manual Treatments:  PROM / STM / Oscillations-Mobs:  G-I, II, III, IV (PA's, Inf., Post.)  [x] (70252) Provided manual therapy to mobilize proximal hip and LS spine soft tissue/joints for the purpose of modulating pain, promoting relaxation,  increasing ROM, reducing/eliminating soft tissue swelling/inflammation/restriction, improving soft tissue extensibility and allowing for proper ROM for normal function with self care, mobility, lifting and ambulation. Modalities:      [] GR/ESU 15 min    [] GR 15 min  [] ESU     [] CP    [] MHP    [x] declined    Charges:  Timed Code Treatment Minutes: 30   Total Treatment Minutes: 30     [] EVAL (LOW) 36005 (typically 20 minutes face-to-face)  [] EVAL (MOD) 63263 (typically 30 minutes face-to-face)  [] EVAL (HIGH) 65484 (typically 45 minutes face-to-face)  [] RE-EVAL     [x] BV(16968) x  1 [] IONTO  [] NMR (54985) x     [] VASO  [x] Manual (63622) x 1     [] Other:  [] TA x      [] Mech Traction (66703)  [] ES(attended) (71682)      [] ES (un) (39727):      Trigger point Dry Needling:  fine needle insertion into myofascial trigger points to stimulate a healing response  [x] (08312) Needle insertion without injection: 1 or 2 muscles  [] (73965) Needle insertion without injection: 3 or more muscles      Goals: Patient stated goal: Pain free return to powerlifting and running  [x] Progressing: [] Met: [] Not Met: [] Adjusted     Therapist goals for Patient:   Short Term Goals: To be achieved in: 2 weeks  1. Independent in HEP and progression per patient tolerance, in order to prevent re-injury. [x] Progressing: [] Met: [] Not Met: [] Adjusted   2. Patient will have a decrease in pain to facilitate improvement in movement, function, and ADLs as indicated by Functional Deficits. [x] Progressing: [] Met: [] Not Met: [] Adjusted     Long Term Goals: To be achieved in: 12 weeks  1. Disability index score of 8% or less for the oswestry  to assist with reaching prior level of function. [x] Progressing: [] Met: [] Not Met: [] Adjusted   2. Patient will demonstrate increased AROM to WNL, good LS mobility, good hip ROM to allow for proper joint functioning as indicated by patients Functional Deficits. [x] Progressing: [] Met: [] Not Met: [] Adjusted   3. Patient will demonstrate an increase in Strength to good proximal hip and core activation to allow for proper functional mobility as indicated by patients Functional Deficits. [x] Progressing: [] Met: [] Not Met: [] Adjusted   4. Patient will return to running 5+miles at a time without increased symptoms or restriction. [x] Progressing: [] Met: [] Not Met: [] Adjusted   5. Patient will demonstrate appropriate mechanics with quatting/deadlift with no compensation to reduce risk of re-injury. [x] Progressing: [] Met: [] Not Met: [] Adjusted      Overall Progression Towards Functional goals/ Treatment Progress Update:  [x] Patient is progressing as expected towards functional goals listed. [] Progression is slowed due to complexities/Impairments listed. [] Progression has been slowed due to co-morbidities.   [] Plan just implemented, too soon to assess goals progression <30days   [] Goals require adjustment due to lack of progress  [] Patient is not progressing as expected and requires additional follow up with physician  [] Other    Prognosis for POC: [x] Good [] Fair  [] Poor      Patient requires continued skilled intervention: [x] Yes  [] No      ASSESSMENT:  Cy Whitman continued to tolerate increased volume without an increase in symptoms. He was a few minutes late to therapy today, limiting our overall workload, however he did report good benefits from [facility] and lumbopelvic manipulation regarding his soreness and tightness. He would continue to benefit from skilled physical therapy to maximize his functional outcomes and progress towards goals. Treatment/Activity Tolerance:  [x] Patient tolerated treatment well [] Patient limited by fatigue  [] Patient limited by pain  [] Patient limited by other medical complications  [] Other:       PLAN:   [x] Continue per plan of care [] Alter current plan (see comments above)  [] Plan of care initiated [] Hold pending MD visit [] Discharge      Electronically signed by:  Nino Choudhury, PT , DPT     Note: If patient does not return for scheduled/ recommended follow up visits, this note will serve as a discharge from care along with most recent update on progress.

## 2021-10-22 ENCOUNTER — HOSPITAL ENCOUNTER (OUTPATIENT)
Dept: PHYSICAL THERAPY | Age: 21
Setting detail: THERAPIES SERIES
Discharge: HOME | End: 2021-10-22
Payer: COMMERCIAL

## 2021-10-22 PROCEDURE — 20560 NDL INSJ W/O NJX 1 OR 2 MUSC: CPT

## 2021-10-22 PROCEDURE — 97140 MANUAL THERAPY 1/> REGIONS: CPT

## 2021-10-22 PROCEDURE — 97110 THERAPEUTIC EXERCISES: CPT

## 2021-10-22 PROCEDURE — 97112 NEUROMUSCULAR REEDUCATION: CPT

## 2021-10-22 NOTE — PROGRESS NOTES
[facility],  [address]  Phone: [phone]  Fax [phone]    Physical Therapy Progress Note/Daily Treatment Note  Date:  10/22/2021    Patient Name:  Jagdish Dotson    :  2000  MRN: [id number]  Restrictions/Precautions:    Physician Information:  Referring Practitioner: Bhavin Baltazar CNP  Medical/Treatment Diagnosis Information:  Diagnosis: M47.818 (ICD-10-CM) - SI joint arthritis  Treatment Diagnosis: SIJ pain left M25.552; generalized core/hip weakness M62.81(2); LBP M54.5  [x] Conservative / [] Surgical - DOS:  Therapy Diagnosis/Practice Pattern:  Practice Pattern F: Spinal Disorders  Insurance/Certification information:  PT Insurance Information:  [employer]ICAL [place]  - 1000D-90/10-$0CP-PT/OT MED NEC -  [address] signed: [] YES  [x] NO  Number of Comorbidities:  []0     [x]1-2    []3+  Date of Patient follow up with Physician:     Is this a Progress Report:     []  Yes  [x]  No        If Yes:  Date Range for reporting period:  Beginning 2021  Ending 10/22/2021    Progress report will be due (10 Rx or 30 days whichever is less):        Recertification will be due (POC Duration  / 90 days whichever is less): 12/10/2021      Latex Allergy:  [x]NO      []YES  Preferred Language for Healthcare:   [x]English       []other:    Visit # Insurance Allowable   8 BMN  auth [x]no        []yes:       SUBJECTIVE: Pt reports back feeling a little tighter today on the left side.       OBJECTIVE:    Observation:   Palpation:     Test used Initial score Current Score   Pain Summary VAS 0-6 0-1   Functional questionnaire Oswestry 16% 8/50 6%   ROM Lumbar Flexion WNL throughout with slight discomfort from OP Still some discomfort with side bend and flexion at end ranges    Lumbar Ext      Lumbar SB L/R      Lumbar rotation L/R     Strength Hip ext      ABD      TrA RESTRICTIONS/PRECAUTIONS: none    Exercises/Interventions:     Therapeutic Ex/NMR re-education  sets/reps Notes HEP   Rasheed pose 3D  Thread the needle to open book 3 x 20\"  15 x R/L     Advanced LTR 10 x   X   [place] mob  Hamstring Flossing  Kneeling hip flexor  12 x  x10 R/L  15 x  X   Supine tap downs with KB hold  20#    X walk  High hold good morning  CC Anti rotation Walk outs  Anti rotation press with twist 4 laps  3x10  2x10 ea way  2 x 10 Red  Blue  35#  30# X         RDL to gobletSingle leg RDLOn BOSU - corrected left sided dip    Reverse hyper's off [id number]   Hip hike/drop Limited motion doing R. On L P! In QL (pt reports this is one of comparable signs)            Pt education on PT progression with higher level function and goals, possible modalities and plan/prognosis     Manual Intervention       SIJ mobs gr 3-4, L STM piriformis, deep pressure to piriformis with hip IR in prone  Deep pressure to L QL 10 min     Lateral mulligan hip mobs       lumbopelvic manipulation, prone thoracic PA manipulation   4 min           Dry needling 5 min Not including manual assessment   Access Code: [email or web address]  URL: [email or web address]  Date: 09/17/2021  Prepared by: Robert Luis    Exercises  Supine Lower Trunk Rotation - 1 x daily - 7 x weekly - 15 reps - 3-5 hold  [place] Pose - 1 x daily - 7 x weekly - 15 reps - 3-5 hold  Figure 4 Bridge - 1 x daily - 7 x weekly - 2-3 sets - 12 reps  Side Plank with Clam and Resistance - 1 x daily - 7 x weekly - 2-3 sets - 12 reps  X Band Walk - 1 x daily - 7 x weekly - 2-3 sets - 10 steps  Squatting Anti-Rotation Press - 1 x daily - 7 x weekly - 2-3 sets - 12 reps    NO DRY NEEDLING 10/18/21  Consent signed 10/15/2020 and precautions addressed. See media tab. Muscle  Needle Size Technique Notes IES   Site 1 TL multifidi/paraspinals 5x . 3x75mm [x] Pistoning / []  Threading  [x]  Winding/Coning Increased tissue resistance throughout, release last needle []    Site 2 [x] Pistoning / []  Threading  [x]  Winding/Coning Increased tissue resistance throughout []    Site 3   [] Pistoning / []  Threading  []  Winding/Coning  []    Site 4   [] Pistoning / []  Threading  []  Winding/Coning  []    Site 5   [] Pistoning / []  Threading  []  Winding/Coning  []    Site 6   [] Pistoning / []  Threading  []  Winding/Coning  []    Site 7   [] Pistoning / []  Threading  []  Winding/Coning  []      **The above techniques were used to restore functional range of motion, reduce muscle spasm, and induce healing in the corresponding musculature by means of intramuscular mobilization. Clean Technique was utilized today while applying the Dry needling treatment. The treatment sites where cleaned with 70% solution of isopropyl alcohol. The PT washed their hands and utilized treatment gloves along with hand  prior to inserting the needles. All needles where removed and discarded in the appropriate sharps container. MD has given verbal and/or written approval for this treatment. **    ** Educated patient on anatomy, trigger point etiology, expectations for TDN (bruising, soreness, etc), outcomes, and recommendations for exercise. **      Therapeutic Exercise and NMR EXR  [x] (84550) Provided verbal/tactile cueing for activities related to strengthening, flexibility, endurance, ROM  for improvements in proximal hip and core control with self care, mobility, lifting and ambulation. [x] (60098) Provided verbal/tactile cueing for activities related to improving balance, coordination, kinesthetic sense, posture, motor skill, proprioception  to assist with core control in self care, mobility, lifting, and ambulation.      Therapeutic Activities:    [] (51080 or 95918) Provided verbal/tactile cueing for activities related to improving balance, coordination, kinesthetic sense, posture, motor skill, proprioception and motor activation to allow for proper function  with self care and ADLs  [] (58618) Provided training and instruction to the patient for proper core and proximal hip recruitment and positioning with ambulation re-education     Home Exercise Program:    [x] (10882) Reviewed/Progressed HEP activities related to strengthening, flexibility, endurance, ROM of core, proximal hip and LE for functional self-care, mobility, lifting and ambulation   [] (48195) Reviewed/Progressed HEP activities related to improving balance, coordination, kinesthetic sense, posture, motor skill, proprioception of core, proximal hip and LE for self care, mobility, lifting, and ambulation      Manual Treatments:  PROM / STM / Oscillations-Mobs:  G-I, II, III, IV (PA's, Inf., Post.)  [x] (50932) Provided manual therapy to mobilize proximal hip and LS spine soft tissue/joints for the purpose of modulating pain, promoting relaxation,  increasing ROM, reducing/eliminating soft tissue swelling/inflammation/restriction, improving soft tissue extensibility and allowing for proper ROM for normal function with self care, mobility, lifting and ambulation. Modalities:      [] GR/ESU 15 min    [] GR 15 min  [] ESU     [] CP    [] MHP    [x] declined    Charges:  Timed Code Treatment Minutes: 40   Total Treatment Minutes: 45     [] EVAL (LOW) 10256 (typically 20 minutes face-to-face)  [] EVAL (MOD) 93471 (typically 30 minutes face-to-face)  [] EVAL (HIGH) 83809 (typically 45 minutes face-to-face)  [] RE-EVAL     [x] (57233) x 1  [] IONTO  [x] NMR (77223) x 1    [] VASO  [x] Manual (98095) x 1     [] Other:  [] TA x      [] Mech Traction (49721)  [] ES(attended) (20225)      [] ES (un) (10427):      Trigger point Dry Needling:  fine needle insertion into myofascial trigger points to stimulate a healing response  [x] (61299) Needle insertion without injection: 1 or 2 muscles  [] (05170) Needle insertion without injection: 3 or more muscles      Goals: Patient stated goal: Pain free return to powerlifting and running  [x] Progressing: [] Met: [] Not Met: [] Adjusted     Therapist goals for Patient:   Short Term Goals: To be achieved in: 2 weeks  1. Independent in HEP and progression per patient tolerance, in order to prevent re-injury. [] Progressing: [x] Met: [] Not Met: [] Adjusted   2. Patient will have a decrease in pain to facilitate improvement in movement, function, and ADLs as indicated by Functional Deficits. [] Progressing: [x] Met: [] Not Met: [] Adjusted     Long Term Goals: To be achieved in: 12 weeks  1. Disability index score of 8% or less for the oswestry  to assist with reaching prior level of function. [] Progressing: [x] Met: [] Not Met: [] Adjusted   2. Patient will demonstrate increased AROM to WNL, good LS mobility, good hip ROM to allow for proper joint functioning as indicated by patients Functional Deficits. [x] Progressing: [x] Met: [] Not Met: [] Adjusted   3. Patient will demonstrate an increase in Strength to good proximal hip and core activation to allow for proper functional mobility as indicated by patients Functional Deficits. [x] Progressing: [] Met: [] Not Met: [] Adjusted   4. Patient will return to running 5+miles at a time without increased symptoms or restriction. [x] Progressing: [] Met: [] Not Met: [] Adjusted   5. Patient will demonstrate appropriate mechanics with quatting/deadlift with no compensation to reduce risk of re-injury. [] Progressing: [x] Met: [] Not Met: [] Adjusted      Overall Progression Towards Functional goals/ Treatment Progress Update:  [x] Patient is progressing as expected towards functional goals listed. [] Progression is slowed due to complexities/Impairments listed. [] Progression has been slowed due to co-morbidities.   [] Plan just implemented, too soon to assess goals progression <30days   [] Goals require adjustment due to lack of progress  [] Patient is not progressing as expected and requires additional follow up with physician  [] Other    Prognosis for POC: [x] Good [] Fair  [] Poor      Patient requires continued skilled intervention: [x] Yes  [] No      ASSESSMENT:  Norah Gunter continued to tolerate increased volume without an increase in symptoms. He was a few minutes late to therapy today, limiting our overall workload, however he did report good benefits from [facility] and lumbopelvic manipulation regarding his soreness and tightness. He would continue to benefit from skilled physical therapy to maximize his functional outcomes and progress towards goals. Treatment/Activity Tolerance:  [x] Patient tolerated treatment well [] Patient limited by fatigue  [] Patient limited by pain  [] Patient limited by other medical complications  [] Other:       PLAN:   [x] Continue per plan of care [] Alter current plan (see comments above)  [] Plan of care initiated [] Hold pending MD visit [] Discharge      Electronically signed by:  Kimi Ba, PT , DPT     Note: If patient does not return for scheduled/ recommended follow up visits, this note will serve as a discharge from care along with most recent update on progress.

## 2021-10-25 ENCOUNTER — HOSPITAL ENCOUNTER (OUTPATIENT)
Dept: PHYSICAL THERAPY | Age: 21
Setting detail: THERAPIES SERIES
Discharge: HOME | End: 2021-10-25
Payer: COMMERCIAL

## 2021-10-25 PROCEDURE — 97140 MANUAL THERAPY 1/> REGIONS: CPT

## 2021-10-25 PROCEDURE — 97110 THERAPEUTIC EXERCISES: CPT

## 2021-10-25 PROCEDURE — 97112 NEUROMUSCULAR REEDUCATION: CPT

## 2021-10-25 NOTE — PROGRESS NOTES
[facility],  [address] [place]  Phone: [phone]  Fax [phone]    Physical Therapy Progress Note/Daily Treatment Note  Date:  10/25/2021    Patient Name:  Lorrie Oscar    :  2000  MRN: [id number]  Restrictions/Precautions:    Physician Information:  Referring Practitioner: Sunitha Taylor CNP  Medical/Treatment Diagnosis Information:  Diagnosis: M47.818 (ICD-10-CM) - SI joint arthritis  Treatment Diagnosis: SIJ pain left M25.552; generalized core/hip weakness M62.81(2); LBP M54.5  [x] Conservative / [] Surgical - DOS:  Therapy Diagnosis/Practice Pattern:  Practice Pattern F: Spinal Disorders  Insurance/Certification information:  PT Insurance Information:  MEDICAL [place]  - 1000D-90/10-$0CP-PT/OT MED NEC -  [address] signed: [] YES  [x] NO  Number of Comorbidities:  []0     [x]1-2    []3+  Date of Patient follow up with Physician:     Is this a Progress Report:     []  Yes  [x]  No        If Yes:  Date Range for reporting period:  Beginning 10/22/2021  Ending     Progress report will be due (10 Rx or 30 days whichever is less):        Recertification will be due (POC Duration  / 90 days whichever is less): 12/10/2021      Latex Allergy:  [x]NO      []YES  Preferred Language for Healthcare:   [x]English       []other:    Visit # Insurance Allowable   9 BMN  auth [x]no        []yes:       SUBJECTIVE: Pt reports back is feeling better overall today, no real soreness.  Lifted over the weekend but did not do squats or RDL's      OBJECTIVE:    Observation:   Palpation:     Test used Initial score Current Score   Pain Summary VAS 0-6 0-1   Functional questionnaire Oswestry 16% 8/50 6%   ROM Lumbar Flexion WNL throughout with slight discomfort from OP Still some discomfort with side bend and flexion at end ranges    Lumbar Ext      Lumbar SB L/R      Lumbar rotation L/R Strength Hip ext      ABD      TrA       RESTRICTIONS/PRECAUTIONS: none    Exercises/Interventions:     Therapeutic Ex/NMR re-education  sets/reps Notes HEP   Rasheed pose 3D  Thread the needle to open book 3 x 20\"  15 x R/L     Advanced LTR 10 x   X   [place] mob  Hamstring Flossing  Kneeling hip flexor  12 x    X   Supine tap downs with KB hold  20#    X walk  High hold good morning  CC Anti rotation Walk outs  Anti rotation press with twist 4 laps  2x15  2x10 ea way   Red  Blue  45#   X         RDL to gobletBulgarian Split Squat20# KB [id number] R/L  Corrected R side lag            Pt education on acute vs chronic load ; building strength base with volume rather than intensity; keeping lifting days between 5-8 RPE 20'    Manual Intervention       SIJ mobs gr 3-4, L STM piriformis, deep pressure to piriformis with hip IR in prone  Deep pressure to L QL 10 min     Lateral mulligan hip mobs         Access Code: [email or web address]  URL: [email or web address]  Date: 09/17/2021  Prepared by: Edil Ruth    Exercises  Supine Lower Trunk Rotation - 1 x daily - 7 x weekly - 15 reps - 3-5 hold  [place] Run Pose - 1 x daily - 7 x weekly - 15 reps - 3-5 hold  Figure 4 Bridge - 1 x daily - 7 x weekly - 2-3 sets - 12 reps  Side Plank with Clam and Resistance - 1 x daily - 7 x weekly - 2-3 sets - 12 reps  X Band Walk - 1 x daily - 7 x weekly - 2-3 sets - 10 steps  Squatting Anti-Rotation Press - 1 x daily - 7 x weekly - 2-3 sets - 12 reps    NO DRY NEEDLING 10/18/21  Consent signed 10/15/2020 and precautions addressed. See media tab. Muscle  Needle Size Technique Notes IES   . 3x75mm [x] Pistoning / []  Threading  [x]  Winding/Coning Increased tissue resistance throughout, release last needle []    Site 2 [x] Pistoning / []  Threading  [x]  Winding/Coning Increased tissue resistance throughout []    Site 3   [] Pistoning / []  Threading  []  Winding/Coning  []    Site 4   [] Pistoning / []  Threading  []  Winding/Coning  []    Site 5   [] Pistoning / []  Threading  []  Winding/Coning  []    Site 6   [] Pistoning / []  Threading  []  Winding/Coning  []    Site 7   [] Pistoning / []  Threading  []  Winding/Coning  []      **The above techniques were used to restore functional range of motion, reduce muscle spasm, and induce healing in the corresponding musculature by means of intramuscular mobilization. Clean Technique was utilized today while applying the Dry needling treatment. The treatment sites where cleaned with 70% solution of isopropyl alcohol. The PT washed their hands and utilized treatment gloves along with hand  prior to inserting the needles. All needles where removed and discarded in the appropriate sharps container. MD has given verbal and/or written approval for this treatment. **    ** Educated patient on anatomy, trigger point etiology, expectations for TDN (bruising, soreness, etc), outcomes, and recommendations for exercise. **      Therapeutic Exercise and NMR EXR  [x] (47781) Provided verbal/tactile cueing for activities related to strengthening, flexibility, endurance, ROM  for improvements in proximal hip and core control with self care, mobility, lifting and ambulation. [x] (27001) Provided verbal/tactile cueing for activities related to improving balance, coordination, kinesthetic sense, posture, motor skill, proprioception  to assist with core control in self care, mobility, lifting, and ambulation.      Therapeutic Activities:    [] (93146 or 37810) Provided verbal/tactile cueing for activities related to improving balance, coordination, kinesthetic sense, posture, motor skill, proprioception and motor activation to allow for proper function  with self care and ADLs  [] (60238) Provided training and instruction to the patient for proper core and proximal hip recruitment and positioning with ambulation re-education     Home Exercise Program:    [x] (71715) Reviewed/Progressed HEP activities related to strengthening, flexibility, endurance, ROM of core, proximal hip and LE for functional self-care, mobility, lifting and ambulation   [] (31726) Reviewed/Progressed HEP activities related to improving balance, coordination, kinesthetic sense, posture, motor skill, proprioception of core, proximal hip and LE for self care, mobility, lifting, and ambulation      Manual Treatments:  PROM / STM / Oscillations-Mobs:  G-I, II, III, IV (PA's, Inf., Post.)  [x] (25724) Provided manual therapy to mobilize proximal hip and LS spine soft tissue/joints for the purpose of modulating pain, promoting relaxation,  increasing ROM, reducing/eliminating soft tissue swelling/inflammation/restriction, improving soft tissue extensibility and allowing for proper ROM for normal function with self care, mobility, lifting and ambulation. Modalities:      [] GR/ESU 15 min    [] GR 15 min  [] ESU     [] CP    [] MHP    [x] declined    Charges:  Timed Code Treatment Minutes: 45   Total Treatment Minutes: 45     [] EVAL (LOW) 75016 (typically 20 minutes face-to-face)  [] EVAL (MOD) 94992 (typically 30 minutes face-to-face)  [] EVAL (HIGH) 92767 (typically 45 minutes face-to-face)  [] RE-EVAL     [x] OQ(04573) x 1   [] IONTO  [x] NMR (61535) x 1    [] VASO  [x] Manual (09565) x 1     [] Other:  [] TA x      [] Mech Traction (06622)  [] ES(attended) (03922)      [] ES (un) (07571): Trigger point Dry Needling:  fine needle insertion into myofascial trigger points to stimulate a healing response  [] (86725) Needle insertion without injection: 1 or 2 muscles  [] (55754) Needle insertion without injection: 3 or more muscles      Goals: Patient stated goal: Pain free return to powerlifting and running  [x] Progressing: [] Met: [] Not Met: [] Adjusted     Therapist goals for Patient:   Short Term Goals: To be achieved in: 2 weeks  1. Independent in HEP and progression per patient tolerance, in order to prevent re-injury.    [] Progressing: [x] Met: [] Not Met: [] Adjusted   2. Patient will have a decrease in pain to facilitate improvement in movement, function, and ADLs as indicated by Functional Deficits. [] Progressing: [x] Met: [] Not Met: [] Adjusted     Long Term Goals: To be achieved in: 12 weeks  1. Disability index score of 8% or less for the oswestry  to assist with reaching prior level of function. [] Progressing: [x] Met: [] Not Met: [] Adjusted   2. Patient will demonstrate increased AROM to WNL, good LS mobility, good hip ROM to allow for proper joint functioning as indicated by patients Functional Deficits. [x] Progressing: [x] Met: [] Not Met: [] Adjusted   3. Patient will demonstrate an increase in Strength to good proximal hip and core activation to allow for proper functional mobility as indicated by patients Functional Deficits. [x] Progressing: [] Met: [] Not Met: [] Adjusted   4. Patient will return to running 5+miles at a time without increased symptoms or restriction. [x] Progressing: [] Met: [] Not Met: [] Adjusted   5. Patient will demonstrate appropriate mechanics with quatting/deadlift with no compensation to reduce risk of re-injury. [] Progressing: [x] Met: [] Not Met: [] Adjusted      Overall Progression Towards Functional goals/ Treatment Progress Update:  [x] Patient is progressing as expected towards functional goals listed. [] Progression is slowed due to complexities/Impairments listed. [] Progression has been slowed due to co-morbidities. [] Plan just implemented, too soon to assess goals progression <30days   [] Goals require adjustment due to lack of progress  [] Patient is not progressing as expected and requires additional follow up with physician  [] Other    Prognosis for POC: [x] Good [] Fair  [] Poor      Patient requires continued skilled intervention: [x] Yes  [] No      ASSESSMENT:  Braeden Bain continues to see small but meaningful improvements during his time with physical therapy.  He did benefit from some in depth discussion regarding his acute vs chronic load as well as proper nutrition that will likely help his overall issue. He would continue to benefit from skilled physical therapy to maximize his functional outcomes and progress towards goals. Treatment/Activity Tolerance:  [x] Patient tolerated treatment well [] Patient limited by fatigue  [] Patient limited by pain  [] Patient limited by other medical complications  [] Other:       PLAN:   [x] Continue per plan of care [] Alter current plan (see comments above)  [] Plan of care initiated [] Hold pending MD visit [] Discharge      Electronically signed by:  Connor Rios, PT , DPT     Note: If patient does not return for scheduled/ recommended follow up visits, this note will serve as a discharge from care along with most recent update on progress.

## 2021-10-29 ENCOUNTER — HOSPITAL ENCOUNTER (OUTPATIENT)
Dept: PHYSICAL THERAPY | Age: 21
Setting detail: THERAPIES SERIES
Discharge: HOME | End: 2021-10-29
Payer: COMMERCIAL

## 2021-10-29 PROCEDURE — 97140 MANUAL THERAPY 1/> REGIONS: CPT

## 2021-10-29 PROCEDURE — 97112 NEUROMUSCULAR REEDUCATION: CPT

## 2021-10-29 PROCEDURE — 97110 THERAPEUTIC EXERCISES: CPT

## 2021-10-29 NOTE — PROGRESS NOTES
[facility],  [address] [place]  Phone: [phone]  Fax [phone]    Physical Therapy Progress Note/Daily Treatment Note  Date:  10/29/2021    Patient Name:  Khari Batres    :  2000  MRN: [id number]  Restrictions/Precautions:    Physician Information:  Referring Practitioner: Walter Stevenson CNP  Medical/Treatment Diagnosis Information:  Diagnosis: M47.818 (ICD-10-CM) - SI joint arthritis  Treatment Diagnosis: SIJ pain left M25.552; generalized core/hip weakness M62.81(2); LBP M54.5  [x] Conservative / [] Surgical - DOS:  Therapy Diagnosis/Practice Pattern:  Practice Pattern F: Spinal Disorders  Insurance/Certification information:  PT Insurance Information:  MEDICAL [place]  - 1000D-90/10-$0CP-PT/OT MED [address] [place] [address] signed: [] YES  [x] NO  Number of Comorbidities:  []0     [x]1-2    []3+  Date of Patient follow up with Physician:     Is this a Progress Report:     []  Yes  [x]  No        If Yes:  Date Range for reporting period:  Beginning 10/22/2021  Ending      Progress report will be due (10 Rx or 30 days whichever is less):        Recertification will be due (POC Duration  / 90 days whichever is less): 12/10/2021      Latex Allergy:  [x]NO      []YES  Preferred Language for Healthcare:   [x]English       []other:    Visit # Insurance Allowable   10+ BMN  auth [x]no        []yes:       SUBJECTIVE: Pt reports he did RDL and squat with lighter weight and had no issues, usually would still feel tight this AM but feels good.       OBJECTIVE:    Observation:   Palpation:     Test used Initial score Current Score   Pain Summary VAS 0-6 0-1   Functional questionnaire Oswestry 16% 8/50 6%   ROM Lumbar Flexion WNL throughout with slight discomfort from OP Still some discomfort with side bend and flexion at end ranges    Lumbar Ext      Lumbar SB L/R      Lumbar rotation L/R Strength Hip ext      ABD      TrA       RESTRICTIONS/PRECAUTIONS: none    Exercises/Interventions:     Therapeutic Ex/NMR re-education  sets/reps Notes HEP   Rasheed pose 3D  Thread the needle to open book 2 x 20\"  15 x R/L     Advanced LTR 10 x   X   [place] mob  Hamstring Flossing  Kneeling hip flexor  12 x    X   Supine tap downs with KB hold  20#    X walk  High hold good morning  CC Anti rotation Walk outs  Anti rotation press with twist 4 laps  2x12  2x10 ea way   Red  Blue  45#   X         RDL to gobletBulgarian Split Squat  Single leg RDL20# KB [id number] R/L 15# KB  2 x 8  Corrected R side lag    Review of shoulder HEP for thoracic spine5 min        Pt education on acute vs chronic load ; building strength base with volume rather than intensity; keeping lifting days between 5-8 RPE 5'    Manual Intervention       SIJ mobs gr 3-4, L STM piriformis, deep pressure to piriformis with hip IR in prone  Deep pressure to L QL 10 min     Lateral mulligan hip mobs         Access Code: [email or web address]  URL: [email or web address]  Date: 09/17/2021  Prepared by: Frandy Villela    Exercises  Supine Lower Trunk Rotation - 1 x daily - 7 x weekly - 15 reps - 3-5 hold  [place] Pose - 1 x daily - 7 x weekly - 15 reps - 3-5 hold  Figure 4 Bridge - 1 x daily - 7 x weekly - 2-3 sets - 12 reps  Side Plank with Clam and Resistance - 1 x daily - 7 x weekly - 2-3 sets - 12 reps  X Band Walk - 1 x daily - 7 x weekly - 2-3 sets - 10 steps  Squatting Anti-Rotation Press - 1 x daily - 7 x weekly - 2-3 sets - 12 reps    NO DRY NEEDLING 10/18/21  Consent signed 10/15/2020 and precautions addressed. See media tab. Muscle  Needle Size Technique Notes IES   . 3x75mm [x] Pistoning / []  Threading  [x]  Winding/Coning Increased tissue resistance throughout, release last needle []    Site 2 [x] Pistoning / []  Threading  [x]  Winding/Coning Increased tissue resistance throughout []    Site 3   [] Pistoning / []  Threading  []  Winding/Coning  [] Site 4   [] Pistoning / []  Threading  []  Winding/Coning  []    Site 5   [] Pistoning / []  Threading  []  Winding/Coning  []    Site 6   [] Pistoning / []  Threading  []  Winding/Coning  []    Site 7   [] Pistoning / []  Threading  []  Winding/Coning  []      **The above techniques were used to restore functional range of motion, reduce muscle spasm, and induce healing in the corresponding musculature by means of intramuscular mobilization. Clean Technique was utilized today while applying the Dry needling treatment. The treatment sites where cleaned with 70% solution of isopropyl alcohol. The PT washed their hands and utilized treatment gloves along with hand  prior to inserting the needles. All needles where removed and discarded in the appropriate sharps container. MD has given verbal and/or written approval for this treatment. **    ** Educated patient on anatomy, trigger point etiology, expectations for TDN (bruising, soreness, etc), outcomes, and recommendations for exercise. **      Therapeutic Exercise and NMR EXR  [x] (32853) Provided verbal/tactile cueing for activities related to strengthening, flexibility, endurance, ROM  for improvements in proximal hip and core control with self care, mobility, lifting and ambulation. [x] (61536) Provided verbal/tactile cueing for activities related to improving balance, coordination, kinesthetic sense, posture, motor skill, proprioception  to assist with core control in self care, mobility, lifting, and ambulation.      Therapeutic Activities:    [] (23402 or 63508) Provided verbal/tactile cueing for activities related to improving balance, coordination, kinesthetic sense, posture, motor skill, proprioception and motor activation to allow for proper function  with self care and ADLs  [] (52922) Provided training and instruction to the patient for proper core and proximal hip recruitment and positioning with ambulation re-education     Home Exercise Program:    [x] (40029) Reviewed/Progressed HEP activities related to strengthening, flexibility, endurance, ROM of core, proximal hip and LE for functional self-care, mobility, lifting and ambulation   [] (71263) Reviewed/Progressed HEP activities related to improving balance, coordination, kinesthetic sense, posture, motor skill, proprioception of core, proximal hip and LE for self care, mobility, lifting, and ambulation      Manual Treatments:  PROM / STM / Oscillations-Mobs:  G-I, II, III, IV (PA's, Inf., Post.)  [x] (54292) Provided manual therapy to mobilize proximal hip and LS spine soft tissue/joints for the purpose of modulating pain, promoting relaxation,  increasing ROM, reducing/eliminating soft tissue swelling/inflammation/restriction, improving soft tissue extensibility and allowing for proper ROM for normal function with self care, mobility, lifting and ambulation. Modalities:      [] GR/ESU 15 min    [] GR 15 min  [] ESU     [] CP    [] MHP    [x] declined    Charges:  Timed Code Treatment Minutes: 45   Total Treatment Minutes: 45     [] EVAL (LOW) 25952 (typically 20 minutes face-to-face)  [] EVAL (MOD) 86737 (typically 30 minutes face-to-face)  [] EVAL (HIGH) 50275 (typically 45 minutes face-to-face)  [] RE-EVAL     [x] OZ(59675) x 1   [] IONTO  [x] NMR (25468) x 1    [] VASO  [x] Manual (16926) x 1     [] Other:  [] TA x      [] Mech Traction (64367)  [] ES(attended) (73225)      [] ES (un) (63550): Trigger point Dry Needling:  fine needle insertion into myofascial trigger points to stimulate a healing response  [] (99111) Needle insertion without injection: 1 or 2 muscles  [] (29025) Needle insertion without injection: 3 or more muscles      Goals: Patient stated goal: Pain free return to powerlifting and running  [x] Progressing: [] Met: [] Not Met: [] Adjusted     Therapist goals for Patient:   Short Term Goals: To be achieved in: 2 weeks  1.  Independent in HEP and progression per patient tolerance, in order to prevent re-injury. [] Progressing: [x] Met: [] Not Met: [] Adjusted   2. Patient will have a decrease in pain to facilitate improvement in movement, function, and ADLs as indicated by Functional Deficits. [] Progressing: [x] Met: [] Not Met: [] Adjusted     Long Term Goals: To be achieved in: 12 weeks  1. Disability index score of 8% or less for the oswestry  to assist with reaching prior level of function. [] Progressing: [x] Met: [] Not Met: [] Adjusted   2. Patient will demonstrate increased AROM to WNL, good LS mobility, good hip ROM to allow for proper joint functioning as indicated by patients Functional Deficits. [x] Progressing: [x] Met: [] Not Met: [] Adjusted   3. Patient will demonstrate an increase in Strength to good proximal hip and core activation to allow for proper functional mobility as indicated by patients Functional Deficits. [x] Progressing: [] Met: [] Not Met: [] Adjusted   4. Patient will return to running 5+miles at a time without increased symptoms or restriction. [x] Progressing: [] Met: [] Not Met: [] Adjusted   5. Patient will demonstrate appropriate mechanics with quatting/deadlift with no compensation to reduce risk of re-injury. [] Progressing: [x] Met: [] Not Met: [] Adjusted      Overall Progression Towards Functional goals/ Treatment Progress Update:  [x] Patient is progressing as expected towards functional goals listed. [] Progression is slowed due to complexities/Impairments listed. [] Progression has been slowed due to co-morbidities.   [] Plan just implemented, too soon to assess goals progression <30days   [] Goals require adjustment due to lack of progress  [] Patient is not progressing as expected and requires additional follow up with physician  [] Other    Prognosis for POC: [x] Good [] Fair  [] Poor      Patient requires continued skilled intervention: [x] Yes  [] No      ASSESSMENT:  Alireza Caldwell continues to see small but meaningful improvements during his time with physical therapy. He did benefit from some in depth discussion regarding his acute vs chronic load as well as proper nutrition that will likely help his overall issue. He would continue to benefit from skilled physical therapy to maximize his functional outcomes and progress towards goals. Treatment/Activity Tolerance:  [x] Patient tolerated treatment well [] Patient limited by fatigue  [] Patient limited by pain  [] Patient limited by other medical complications  [] Other:       PLAN:   [x] Continue per plan of care [] Alter current plan (see comments above)  [] Plan of care initiated [] Hold pending MD visit [] Discharge      Electronically signed by:  Yuriy Tatum, PT , DPT     Note: If patient does not return for scheduled/ recommended follow up visits, this note will serve as a discharge from care along with most recent update on progress.

## 2023-01-30 ENCOUNTER — HOSPITAL ENCOUNTER (EMERGENCY)
Age: 23
Discharge: HOME | End: 2023-01-30
Attending: EMERGENCY MEDICINE
Payer: COMMERCIAL

## 2023-01-30 DIAGNOSIS — J06.9 VIRAL URI WITH COUGH: Primary | ICD-10-CM

## 2023-01-30 DIAGNOSIS — J45.21 MILD INTERMITTENT ASTHMA WITH EXACERBATION: ICD-10-CM

## 2023-01-30 PROCEDURE — 6370000000 HC RX 637 (ALT 250 FOR IP): Performed by: EMERGENCY MEDICINE

## 2023-01-30 PROCEDURE — 99283 EMERGENCY DEPT VISIT LOW MDM: CPT

## 2023-01-30 RX ORDER — IPRATROPIUM BROMIDE AND ALBUTEROL SULFATE 2.5; .5 MG/3ML; MG/3ML
1 SOLUTION RESPIRATORY (INHALATION) ONCE
Status: COMPLETED | OUTPATIENT
Start: 2023-01-30 | End: 2023-01-30

## 2023-01-30 RX ORDER — PREDNISONE 20 MG/1
40 TABLET ORAL DAILY
Qty: 10 TABLET | Refills: 0 | Status: SHIPPED | OUTPATIENT
Start: 2023-01-30 | End: 2023-02-04

## 2023-01-30 RX ORDER — PREDNISONE 20 MG/1
60 TABLET ORAL ONCE
Status: COMPLETED | OUTPATIENT
Start: 2023-01-30 | End: 2023-01-30

## 2023-01-30 RX ADMIN — IPRATROPIUM BROMIDE AND ALBUTEROL SULFATE 1 AMPULE: .5; 3 SOLUTION RESPIRATORY (INHALATION) at 23:13

## 2023-01-30 RX ADMIN — PREDNISONE 60 MG: 20 TABLET ORAL at 23:14

## 2023-01-30 ASSESSMENT — PAIN DESCRIPTION - DESCRIPTORS: DESCRIPTORS: DISCOMFORT

## 2023-01-30 ASSESSMENT — PAIN SCALES - GENERAL: PAINLEVEL_OUTOF10: 4

## 2023-01-30 ASSESSMENT — PAIN DESCRIPTION - PAIN TYPE: TYPE: CHRONIC PAIN

## 2023-01-30 ASSESSMENT — PAIN DESCRIPTION - LOCATION: LOCATION: BACK

## 2023-01-30 ASSESSMENT — PAIN - FUNCTIONAL ASSESSMENT
PAIN_FUNCTIONAL_ASSESSMENT: NONE - DENIES PAIN
PAIN_FUNCTIONAL_ASSESSMENT: 0-10

## 2023-01-31 VITALS
RESPIRATION RATE: 16 BRPM | BODY MASS INDEX: 23.49 KG/M2 | HEART RATE: 71 BPM | HEIGHT: 71 IN | SYSTOLIC BLOOD PRESSURE: 137 MMHG | DIASTOLIC BLOOD PRESSURE: 80 MMHG | WEIGHT: 167.8 LBS | OXYGEN SATURATION: 100 % | TEMPERATURE: 97.7 F

## 2023-01-31 NOTE — ED TRIAGE NOTES
Patient ambulatory to room 8 with complaint of shortness of breath, nasal congestion and cough. Patient has a hx of asthma. CPTA:albuterol inhaler, last used at 730 pm. Negative home COVID test. Symptoms started last night.

## 2023-01-31 NOTE — ED PROVIDER NOTES
[facility]  EMERGENCY DEPT VISIT      Patient Identification  Belén Davis is a 25 y.o. male. Chief Complaint   Shortness of Breath and Cough (Patient ambulatory to room 8 with complaint of shortness of breath, nasal congestion and cough. Patient has a hx of asthma. CPTA:albuterol inhaler, last used at 730 pm. Negative home COVID test. Symptoms started last night.)      History of Present Illness:    History was obtained from patient. This is a  25 y.o. male who presents ambulatory  to the ED with complaints of 24-hour history of cold symptoms with chest tightness and shortness of breath. Patient has a history of asthma and uses his inhaler on a daily basis. For the last 24 hours he has had increased runny nose and a dry cough. No fever. No headache or body aches. No sore throat. His chest feels tight and he has been wheezing. He has used his inhaler about 5 times today but it was not working as well as it normally does. No vomiting or diarrhea. He took a home COVID test that was negative. .     Past Medical History:   Diagnosis Date    Asthma     Eczema     GERD (gastroesophageal reflux disease)        Past Surgical History:   Procedure Laterality Date    ADENOIDECTOMY      MD OFFICE/OUTPT VISIT,PROCEDURE ONLY N/A 10/1/2018    PEDIATRIC BRONCHOSCOPY performed by José Pizano MD at [place] [address] N/A 03/12/2019    with biopsy    UPPER GASTROINTESTINAL ENDOSCOPY N/A 3/12/2019    EGD BIOPSY performed by Ruiz Rivera MD at [place] [address]       No current facility-administered medications for this encounter.     Current Outpatient Medications:     predniSONE (DELTASONE) 20 MG tablet, Take 2 tablets by mouth daily for 5 days, Disp: 10 tablet, Rfl: 0    ATROVENT HFA 17 MCG/ACT inhaler, INHALE 1 PUFF INTO THE LUNGS 3 TIMES A DAY., Disp: 25.8 g, Rfl: 0    cetirizine (ZYRTEC) 10 MG tablet, Take 10 mg by mouth daily, Disp: , Rfl:     albuterol sulfate HFA (PROAIR HFA) 108 (90 BASE) MCG/ACT inhaler, Inhale 2 puffs into the lungs every 6 hours as needed for Wheezing, Disp: 2 Inhaler, Rfl: 0    Respiratory Therapy Supplies (VORTEX HOLDING CHAMBER/MASK) BRANDYN, 1 Device by Does not apply route daily, Disp: 1 Device, Rfl: 0    Allergies   Allergen Reactions    Cat Hair Extract     Dog Epithelium Other (See Comments)     Allergic rhinitis, asthma exacerbation       Social History     Socioeconomic History    Marital status: Single     Spouse name: Not on file    Number of children: Not on file    Years of education: Not on file    Highest education level: Not on file   Occupational History    Occupation: Student   Tobacco Use    Smoking status: Never    Smokeless tobacco: Never   Substance and Sexual Activity    Alcohol use: Yes     Comment: occasionally    Drug use: Yes     Types: Marijuana Daril Davon)    Sexual activity: Not on file   Other Topics Concern    Not on file   Social History Narrative    Not on file     Social Determinants of Health     Financial Resource Strain: Not on file   Food Insecurity: Not on file   Transportation Needs: Not on file   Physical Activity: Not on file   Stress: Not on file   Social Connections: Not on file   Intimate Partner Violence: Not on file   Housing Stability: Not on file       Nursing Notes Reviewed      PHYSICAL EXAM:  GENERAL APPEARANCE: Rhonda Leos is in no acute respiratory distress. Awake and alert. VITAL SIGNS:   ED Triage Vitals [01/30/23 2235]   Enc Vitals Group      /85      Heart Rate 65      Resp 12      Temp 97.7 °F (36.5 °C)      Temp Source Oral      SpO2 100 %      Weight 167 lb 12.8 oz (76.1 kg)      Height 5' 11\" (1.803 m)      Head Circumference       Peak Flow       Pain Score       Pain Loc       Pain Edu? Excl. in [address]? HEAD: Normocephalic, atraumatic. EYES:  Extraocular muscles are intact. Pupils equal round and reactive to light. Conjunctivas are pink. Negative scleral icterus.    ENT:  Mucous membranes are moist.  Pharynx without erythema or exudates. NECK: Nontender and supple. No cervical adenopathy. CHEST: few faint expiratory wheezes. No rales. No rhonchi. Mild tachypnea  HEART:  Regular rate and regular rhythm. No murmurs. Strong and equal pulses in the upper and lower extremities. ABDOMEN: Soft,  nondistended, positive bowel sounds. abdomen is nontender. No rebound. no guarding. MUSCULOSKELETAL: The calves are nontender to palpation. Active range of motion of the upper and lower extremities. No edema. NEUROLOGICAL: Awake, alert and oriented x 3. DERMATOLOGIC: No petechiae, rashes, or ecchymoses. No erythema. PSYCH: normal mood and affect. Normal thought content. ED COURSE AND MEDICAL DECISION MAKING:    Record Review:  I have reviewed [facility] old records which reveal the following pertinent information:   none    Radiology:  Films have been read by radiologist as noted in chart unless otherwise stated. Other radiologic studies (i.e. CT, MRI, ultrasounds, etc ) have been interpreted by radiologist.       No orders to display       Labs:  No results found for this visit on 01/30/23. Treatment in the department:  Patient received the following while in the ED. Medications   predniSONE (DELTASONE) tablet 60 mg (60 mg Oral Given 1/30/23 2313)   ipratropium-albuterol (DUONEB) nebulizer solution 1 ampule (1 ampule Inhalation Given 1/30/23 2313)       Repeat exam  shows increased aeration. No wheezing    Medical decision making with differential diagnosis:  Social determinants affecting care: none  Chronic conditions affecting care: none    Patient with underling asthma here with runny nose, cough, and wheezing. No hypoxia. Minimal bronchsopasm on exam. No hypoxia. Satting 100% on room air. Afebrile with 24 hours of symptoms, unlikely to show pneumonia. No CXR ordered. Will add steroids. Refer to pulm. No risk factors for PE and PERC negaive.    I estimate there is LOW risk for PULMONARY EMBOLISM, ACUTE CORONARY SYNDROME, CHF, PNEUMONIA, PNEUMOTHORAX, STATUS ASTHMATICUS, or RESPIRATORY FAILURE,  thus I consider the discharge disposition reasonable. Arnold Parnell and I have discussed the diagnosis and risks, and we agree with discharging home to follow-up with their primary doctor. We also discussed returning to the Emergency Department immediately if new or worsening symptoms occur.        Clinical Impression:  1. Viral URI with cough    2. Mild intermittent asthma with exacerbation        Dispo:  Patient will be discharged at this time. Patient was informed of this decision and agrees with plan. I have discussed lab and xray findings with patient and they understand. Questions were answered to the best of my ability.      Followup plan:  Lee Rosales MD  [address] [facility]  [address]  [facility] [address] [phone]    Schedule an appointment as soon as possible for a visit       Discharge vitals:  Blood pressure 137/80, pulse 71, temperature 97.7 °F (36.5 °C), temperature source Oral, resp. rate 16, height 5' 11\" (1.803 m), weight 167 lb 12.8 oz (76.1 kg), SpO2 100 %.    Prescriptions given:   Discharge Medication List as of 1/31/2023 12:02 AM        START taking these medications    Details   predniSONE (DELTASONE) 20 MG tablet Take 2 tablets by mouth daily for 5 days, Disp-10 tablet, R-0Print             I personally saw the patient and independently provided 0 minutes of non-concurrent critical care out of the total shared critical care time provided.      This chart was created using Dragon voice recognition software.        Charlee Squires MD  01/31/23 0537

## 2023-01-31 NOTE — DISCHARGE INSTRUCTIONS
Continue albuterol every 4 hours as needed. Return for fever, increased chest pain, increased trouble breathing, trouble swallowing, vomiting.

## (undated) DEVICE — SINGLE-USE BIOPSY FORCEPS: Brand: RADIAL JAW 4